# Patient Record
Sex: FEMALE | Race: WHITE | Employment: OTHER | URBAN - METROPOLITAN AREA
[De-identification: names, ages, dates, MRNs, and addresses within clinical notes are randomized per-mention and may not be internally consistent; named-entity substitution may affect disease eponyms.]

---

## 2018-08-09 ENCOUNTER — OFFICE VISIT (OUTPATIENT)
Dept: ORTHOPEDIC SURGERY | Age: 53
End: 2018-08-09

## 2018-08-09 VITALS
DIASTOLIC BLOOD PRESSURE: 84 MMHG | HEIGHT: 65 IN | BODY MASS INDEX: 35.99 KG/M2 | SYSTOLIC BLOOD PRESSURE: 144 MMHG | HEART RATE: 93 BPM | WEIGHT: 216 LBS

## 2018-08-09 DIAGNOSIS — S83.241A TEAR OF MEDIAL MENISCUS OF RIGHT KNEE, UNSPECIFIED TEAR TYPE, UNSPECIFIED WHETHER OLD OR CURRENT TEAR, INITIAL ENCOUNTER: ICD-10-CM

## 2018-08-09 DIAGNOSIS — M25.561 RIGHT KNEE PAIN, UNSPECIFIED CHRONICITY: Primary | ICD-10-CM

## 2018-08-09 PROCEDURE — 99203 OFFICE O/P NEW LOW 30 MIN: CPT | Performed by: ORTHOPAEDIC SURGERY

## 2018-08-09 RX ORDER — CELECOXIB 200 MG/1
CAPSULE ORAL
COMMUNITY
Start: 2012-01-26

## 2018-08-09 RX ORDER — VENLAFAXINE HYDROCHLORIDE 75 MG/1
75 CAPSULE, EXTENDED RELEASE ORAL
COMMUNITY
Start: 2018-06-27

## 2018-08-09 RX ORDER — METOPROLOL SUCCINATE 25 MG/1
25 TABLET, EXTENDED RELEASE ORAL
COMMUNITY
Start: 2018-06-11 | End: 2019-05-16 | Stop reason: ALTCHOICE

## 2018-08-09 ASSESSMENT — ENCOUNTER SYMPTOMS: SHORTNESS OF BREATH: 1

## 2018-08-09 NOTE — PROGRESS NOTES
tablet Take 100 mg by mouth once as needed. May repeat in two hours as needed        No current facility-administered medications for this visit. Allergies: Allergies   Allergen Reactions    Amoxicillin Nausea Only    Aspirin Nausea Only       Physical Exam:  Vitals:    08/09/18 1348   BP: (!) 144/84   Pulse: 93     General: Kirk Vegas is a healthy and well appearing 46y.o. year old female who is sitting comfortably in our office in no acute distress. Neuro: alert. oriented  Eyes: Extra-ocular muscles intact  Mouth: Oral mucosa moist. No perioral lesions  Pulm: Respirations unlabored and regular. Heart: Regular rate and rhythm   Skin: warm, well perfused    Knee Examination:  She has exquisite tenderness over the medial joint line and medial meniscus.       RIGHT     LEFT  General:   EFFUSION:     [] NL(4) [x] Mild(3) [] Mod(2) [] Sev(1)   [x] NL(4) [] Mild(3) [] Mod(2) [] Sev(1)    TOTAL FLEX:  [x] NL(4) [] Mild(3) [] Mod(2) [] Sev(1)   [x] NL(4) [] Mild(3) [] Mod(2) [] Sev(1)    LACK of EXT:  [x] NL(4) [] Mild(3) [] Mod(2) [] Sev(1)   [x] NL(4) [] Mild(3) [] Mod(2) [] Sev(1)    QUAD WEAK: [] NL(4) [x] Mild(3) [] Mod(2) [] Sev(1)   [x] NL(4) [] Mild(3) [] Mod(2) [] Sev(1)    Points (16)  R:       L:          Tibio Femoral:       RIGHT     LEFT  JT LINE PAIN:     [] NL(4) [] Mild(3) [x] Mod(2) [] Sev(1)   [x] NL(4) [] Mild(3) [] Mod(2) [] Sev(1)    CREPITUS:        [x] NL(4) [] Mild(3) [] Mod(2) [] Sev(1)   [x] NL(4) [] Mild(3) [] Mod(2) [] Sev(1)    COMP PAIN:       [x] NL(4) [] Mild(3) [] Mod(2) [] Sev(1)   [x] NL(4) [] Mild(3) [] Mod(2) [] Sev(1)    Points (12)  R:      L:      Patello Femoral Joint:        RIGHT     LEFT  CREPITUS:                 [] NL(4) [x] Mild(3) [] Mod(2) [] Sev(1)   [x] NL(4) [] Mild(3) [] Mod(2) [] Sev(1)    COMP PAIN:               [x] NL(4) [] Mild(3) [] Mod(2) [] Sev(1)   [x] NL(4) [] Mild(3) [] Mod(2) [] Sev(1)    SOFT TISSUE PAIN:  [x] NL(4) []Mild(3) []Mod(2) [] signs of acute trauma. Patient has very mild narrowing of the medial tibiofemoral compartment bilaterally. No other acute bony abnormalities. Diagnosis  Patient is a 59-year-old female status post a work-related injury on 3/14/2018 after she had slipped on some ice and injured the right medial sided knee. Patient is having symptoms of a medial meniscus tear. Plan: At this time we would like to obtain an MRI of the right knee to establish any injuries to her ligaments and meniscus. In the meantime she was asked to use cryotherapy and continue take the Celebrex to minimize any discomfort and swelling. She was also asked to look into various options for weight loss. We are going to submit his C9 for physical therapy and an MRI. All her questions were fully answered today. 3:36 PM      Luli Vasquez PA-C  Orthopaedic Sports Medicine Physician Assistant    During this examination, Isabelle Garvin PA-C, functioned as a scribe for Dr. Jared Rosa. This dictation was performed with a verbal recognition program (DRAGON) and it was checked for errors. It is possible that there are still dictated errors within this office note. If so, please bring any errors to my attention for an addendum. All efforts were made to ensure that this office note is accurate. This dictation was performed with a verbal recognition program (DRAGON) and it was checked for errors. It is possible that there are still dictated errors within this office note. If so, please bring any errors to my attention for an addendum. All efforts were made to ensure that this office note is accurate. Supervising Physician Attestation:  I, Dr. Jared Rosa, personally performed the services described in this documentation as scribed above, and it is both accurate and complete and I agree with all pertinent clinical information. I personally interviewed the patient and performed a physical examination and medical decision making.

## 2018-08-15 DIAGNOSIS — M25.561 RIGHT KNEE PAIN, UNSPECIFIED CHRONICITY: Primary | ICD-10-CM

## 2018-08-15 RX ORDER — DICLOFENAC SODIUM 75 MG/1
50 TABLET, DELAYED RELEASE ORAL 2 TIMES DAILY WITH MEALS
Qty: 40 TABLET | Refills: 1 | Status: SHIPPED | OUTPATIENT
Start: 2018-08-15 | End: 2019-05-16 | Stop reason: ALTCHOICE

## 2018-08-23 ENCOUNTER — OFFICE VISIT (OUTPATIENT)
Dept: ORTHOPEDIC SURGERY | Age: 53
End: 2018-08-23

## 2018-08-23 VITALS
WEIGHT: 216 LBS | BODY MASS INDEX: 35.99 KG/M2 | DIASTOLIC BLOOD PRESSURE: 97 MMHG | HEART RATE: 77 BPM | HEIGHT: 65 IN | SYSTOLIC BLOOD PRESSURE: 162 MMHG

## 2018-08-23 DIAGNOSIS — S83.241D TEAR OF MEDIAL MENISCUS OF RIGHT KNEE, CURRENT, UNSPECIFIED TEAR TYPE, SUBSEQUENT ENCOUNTER: Primary | ICD-10-CM

## 2018-08-23 DIAGNOSIS — M25.561 RIGHT KNEE PAIN, UNSPECIFIED CHRONICITY: ICD-10-CM

## 2018-08-23 PROCEDURE — 99213 OFFICE O/P EST LOW 20 MIN: CPT | Performed by: ORTHOPAEDIC SURGERY

## 2018-08-23 NOTE — PROGRESS NOTES
Chief Complaint    Follow-up (Rt knee MRI Results)      History of Present Illness:  Robby Moon is a 46 y.o. female who presents for follow up of her right knee. The patient was last seen on 8/9/2018 following a work injury. The patient reports that she was pulling some EMG equipment and a suitcase on March 14, 2018 and she slipped on some snow and really jerked her right knee. She began having medial sided right knee pain immediately. She thought this would improve over time however it has not. Patient reports that the pain can cause her to limp and swell. Patient reports the pain can become sharp on occasion especially if she's been on her feet for extended present time. Patient notes her pain is usually around an 8 out of 10. Patient denies any episodes of catching or locking. She has not had any treatment for this injury. The patient reports that her pain has only started to get worse over time. She states that she has been struggling with work and walking. The patient reports no new injuries at this time. Pain Assessment:  Location: right knee  Level: 8 out of 10  Duration: months  Description: sharp, aching  Result of an injury: yes  Work related injury: yes  Aggravating actions: everything  Relieving Factors: rest         Past Medical History:   Diagnosis Date    Arthritis     Brain tumor (benign) ([address]     H/O back injury     L4-L5    HBP (high blood pressure)     Migraine         Past Surgical History:   Procedure Laterality Date    BRAIN SURGERY      TUBAL LIGATION         No family history on file.     Social History     Social History    Marital status: Single     Spouse name: N/A    Number of children: N/A    Years of education: N/A     Social History Main Topics    Smoking status: Former Smoker     Quit date: 1/1/1997    Smokeless tobacco: Never Used    Alcohol use No    Drug use: No    Sexual activity: Not Asked     Other Topics Concern    None     Social History Narrative complains of pain the to the medial patella-femoral compartment and the pain going up to a 8 out of 10. She has done conservative treatment for the past 6 months and it is in my opinion that with medical certainty the patients diagnosis of a medial meniscus tear is directly related to the injury she sustained on March 14, 2018. The patient is in need of a right knee arthroscopy and we will submit a C9 for this to be approved. Diagnoses and treatments were reviewed with the patient today. Patient education material was provided. Questions were entertained and answered to the patient's satisfaction. Follow up for pre-operative appointment. [unfilled]  5:09 PM      IRiana ATC am scribing for and in the presence of Dr. Brandan Coy. 08/23/18 5:09 PM   Riana Bautista ATC    This dictation was performed with a verbal recognition program (DRAGON) and it was checked for errors. It is possible that there are still dictated errors within this office note. If so, please bring any errors to my attention for an addendum. All efforts were made to ensure that this office note is accurate. This dictation was performed with a verbal recognition program (DRAGON) and it was checked for errors. It is possible that there are still dictated errors within this office note. If so, please bring any errors to my attention for an addendum. All efforts were made to ensure that this office note is accurate. Supervising Physician Attestation:  I, Dr. Brandan Coy, personally performed the services described in this documentation as scribed above, and it is both accurate and complete and I agree with all pertinent clinical information. I personally interviewed the patient and performed a physical examination and medical decision making. I discussed the patient's condition and treatment options and have  reviewed and agree with the past medical, family and social history unless otherwise noted.  All of the patient's questions were answered.       Board Certified Orthopaedic Surgeon  [facility] and [address] and [address] and [employer]  Professor of [address]

## 2018-09-12 ENCOUNTER — TELEPHONE (OUTPATIENT)
Dept: ORTHOPEDIC SURGERY | Age: 53
End: 2018-09-12

## 2018-12-10 ENCOUNTER — OFFICE VISIT (OUTPATIENT)
Dept: ORTHOPEDIC SURGERY | Age: 53
End: 2018-12-10
Payer: COMMERCIAL

## 2018-12-10 VITALS
WEIGHT: 216.05 LBS | HEART RATE: 79 BPM | DIASTOLIC BLOOD PRESSURE: 80 MMHG | HEIGHT: 65 IN | SYSTOLIC BLOOD PRESSURE: 144 MMHG | BODY MASS INDEX: 36 KG/M2

## 2018-12-10 DIAGNOSIS — M25.561 RIGHT KNEE PAIN, UNSPECIFIED CHRONICITY: Primary | ICD-10-CM

## 2018-12-10 DIAGNOSIS — S83.241A TEAR OF MEDIAL MENISCUS OF RIGHT KNEE, UNSPECIFIED TEAR TYPE, UNSPECIFIED WHETHER OLD OR CURRENT TEAR, INITIAL ENCOUNTER: ICD-10-CM

## 2018-12-10 PROCEDURE — 99214 OFFICE O/P EST MOD 30 MIN: CPT | Performed by: ORTHOPAEDIC SURGERY

## 2019-04-05 ENCOUNTER — TELEPHONE (OUTPATIENT)
Dept: ORTHOPEDIC SURGERY | Age: 54
End: 2019-04-05

## 2019-04-05 NOTE — TELEPHONE ENCOUNTER
JESSICA GALAN I HAVE SCANNED A SURGERY RECOVERY PLAN & JOB DESCRIPTION FOR THIS IW, THAT WILL NEED TO BE COMPLETED FOLLOWING HER SURGERY POST OP INTO MEDIA.

## 2019-04-05 NOTE — TELEPHONE ENCOUNTER
04/05/2019 Surgery Approved by [facility]   [id number], [id number]      LM for patient surgery is approved and please call at her convenience to schedule surgery         osvaldo

## 2019-04-12 DIAGNOSIS — S83.242A ACUTE MEDIAL MENISCUS TEAR OF LEFT KNEE, INITIAL ENCOUNTER: Primary | ICD-10-CM

## 2019-04-22 ENCOUNTER — TELEPHONE (OUTPATIENT)
Dept: ORTHOPEDIC SURGERY | Age: 54
End: 2019-04-22

## 2019-04-22 DIAGNOSIS — S83.241A ACUTE MEDIAL MENISCUS TEAR OF RIGHT KNEE, INITIAL ENCOUNTER: Primary | ICD-10-CM

## 2019-04-22 NOTE — TELEPHONE ENCOUNTER
Patient called today 04/22/2019 stating she needs to cancel her surgery scheduled  for 05/07/2019 due to a family committed she forgot about.     05/07/2019 Right Knee Surgery - Cancelled     Patient would like to reschedule for 05/21/2019    Surgery will be rescheduled and new paper work to be mailed out     osvaldo

## 2019-05-16 RX ORDER — CETIRIZINE HYDROCHLORIDE 10 MG/1
10 TABLET ORAL
COMMUNITY
End: 2021-04-19 | Stop reason: ALTCHOICE

## 2019-05-16 RX ORDER — CYCLOBENZAPRINE HCL 10 MG
TABLET ORAL
COMMUNITY
Start: 2018-04-25

## 2019-05-16 RX ORDER — TRAMADOL HYDROCHLORIDE 50 MG/1
50 TABLET ORAL PRN
COMMUNITY

## 2019-05-20 ENCOUNTER — ANESTHESIA EVENT (OUTPATIENT)
Dept: OPERATING ROOM | Age: 54
End: 2019-05-20
Payer: COMMERCIAL

## 2019-05-21 ENCOUNTER — HOSPITAL ENCOUNTER (OUTPATIENT)
Age: 54
Setting detail: OUTPATIENT SURGERY
Discharge: HOME | End: 2019-05-21
Attending: ORTHOPAEDIC SURGERY | Admitting: ORTHOPAEDIC SURGERY
Payer: COMMERCIAL

## 2019-05-21 ENCOUNTER — TELEPHONE (OUTPATIENT)
Dept: ORTHOPEDIC SURGERY | Age: 54
End: 2019-05-21

## 2019-05-21 ENCOUNTER — ANESTHESIA (OUTPATIENT)
Dept: OPERATING ROOM | Age: 54
End: 2019-05-21
Payer: COMMERCIAL

## 2019-05-21 VITALS
HEIGHT: 64 IN | WEIGHT: 212 LBS | OXYGEN SATURATION: 98 % | HEART RATE: 89 BPM | SYSTOLIC BLOOD PRESSURE: 158 MMHG | BODY MASS INDEX: 36.19 KG/M2 | DIASTOLIC BLOOD PRESSURE: 90 MMHG | RESPIRATION RATE: 20 BRPM | TEMPERATURE: 98 F

## 2019-05-21 VITALS
RESPIRATION RATE: 13 BRPM | DIASTOLIC BLOOD PRESSURE: 65 MMHG | OXYGEN SATURATION: 96 % | SYSTOLIC BLOOD PRESSURE: 141 MMHG

## 2019-05-21 DIAGNOSIS — Z98.890 H/O ARTHROSCOPY OF KNEE: Primary | ICD-10-CM

## 2019-05-21 LAB — PREGNANCY, URINE: NEGATIVE

## 2019-05-21 PROCEDURE — 2709999900 HC NON-CHARGEABLE SUPPLY: Performed by: ORTHOPAEDIC SURGERY

## 2019-05-21 PROCEDURE — 3700000001 HC ADD 15 MINUTES (ANESTHESIA): Performed by: ORTHOPAEDIC SURGERY

## 2019-05-21 PROCEDURE — 2580000003 HC RX 258: Performed by: ORTHOPAEDIC SURGERY

## 2019-05-21 PROCEDURE — 6370000000 HC RX 637 (ALT 250 FOR IP): Performed by: ANESTHESIOLOGY

## 2019-05-21 PROCEDURE — 3700000000 HC ANESTHESIA ATTENDED CARE: Performed by: ORTHOPAEDIC SURGERY

## 2019-05-21 PROCEDURE — 7100000011 HC PHASE II RECOVERY - ADDTL 15 MIN: Performed by: ORTHOPAEDIC SURGERY

## 2019-05-21 PROCEDURE — 2580000003 HC RX 258: Performed by: ANESTHESIOLOGY

## 2019-05-21 PROCEDURE — 7100000010 HC PHASE II RECOVERY - FIRST 15 MIN: Performed by: ORTHOPAEDIC SURGERY

## 2019-05-21 PROCEDURE — 2500000003 HC RX 250 WO HCPCS: Performed by: NURSE ANESTHETIST, CERTIFIED REGISTERED

## 2019-05-21 PROCEDURE — 3600000014 HC SURGERY LEVEL 4 ADDTL 15MIN: Performed by: ORTHOPAEDIC SURGERY

## 2019-05-21 PROCEDURE — 2500000003 HC RX 250 WO HCPCS: Performed by: ORTHOPAEDIC SURGERY

## 2019-05-21 PROCEDURE — 6360000002 HC RX W HCPCS: Performed by: NURSE ANESTHETIST, CERTIFIED REGISTERED

## 2019-05-21 PROCEDURE — 84703 CHORIONIC GONADOTROPIN ASSAY: CPT

## 2019-05-21 PROCEDURE — 6360000002 HC RX W HCPCS: Performed by: ORTHOPAEDIC SURGERY

## 2019-05-21 PROCEDURE — 6360000002 HC RX W HCPCS: Performed by: ANESTHESIOLOGY

## 2019-05-21 PROCEDURE — 3600000004 HC SURGERY LEVEL 4 BASE: Performed by: ORTHOPAEDIC SURGERY

## 2019-05-21 RX ORDER — HYDROCODONE BITARTRATE AND ACETAMINOPHEN 5; 325 MG/1; MG/1
1 TABLET ORAL ONCE
Status: COMPLETED | OUTPATIENT
Start: 2019-05-21 | End: 2019-05-21

## 2019-05-21 RX ORDER — OXYCODONE HYDROCHLORIDE AND ACETAMINOPHEN 5; 325 MG/1; MG/1
2 TABLET ORAL PRN
Status: DISCONTINUED | OUTPATIENT
Start: 2019-05-21 | End: 2019-05-21 | Stop reason: HOSPADM

## 2019-05-21 RX ORDER — BUPIVACAINE HYDROCHLORIDE 2.5 MG/ML
INJECTION, SOLUTION INFILTRATION; PERINEURAL PRN
Status: DISCONTINUED | OUTPATIENT
Start: 2019-05-21 | End: 2019-05-21 | Stop reason: ALTCHOICE

## 2019-05-21 RX ORDER — PROPOFOL 10 MG/ML
INJECTION, EMULSION INTRAVENOUS PRN
Status: DISCONTINUED | OUTPATIENT
Start: 2019-05-21 | End: 2019-05-21 | Stop reason: SDUPTHER

## 2019-05-21 RX ORDER — ONDANSETRON 2 MG/ML
4 INJECTION INTRAMUSCULAR; INTRAVENOUS
Status: DISCONTINUED | OUTPATIENT
Start: 2019-05-21 | End: 2019-05-21 | Stop reason: HOSPADM

## 2019-05-21 RX ORDER — SODIUM CHLORIDE, SODIUM LACTATE, POTASSIUM CHLORIDE, CALCIUM CHLORIDE 600; 310; 30; 20 MG/100ML; MG/100ML; MG/100ML; MG/100ML
INJECTION, SOLUTION INTRAVENOUS CONTINUOUS
Status: DISCONTINUED | OUTPATIENT
Start: 2019-05-21 | End: 2019-05-21 | Stop reason: HOSPADM

## 2019-05-21 RX ORDER — SODIUM CHLORIDE 0.9 % (FLUSH) 0.9 %
10 SYRINGE (ML) INJECTION PRN
Status: DISCONTINUED | OUTPATIENT
Start: 2019-05-21 | End: 2019-05-21 | Stop reason: HOSPADM

## 2019-05-21 RX ORDER — FENTANYL CITRATE 50 UG/ML
INJECTION, SOLUTION INTRAMUSCULAR; INTRAVENOUS PRN
Status: DISCONTINUED | OUTPATIENT
Start: 2019-05-21 | End: 2019-05-21 | Stop reason: SDUPTHER

## 2019-05-21 RX ORDER — LIDOCAINE HYDROCHLORIDE 20 MG/ML
INJECTION, SOLUTION INFILTRATION; PERINEURAL PRN
Status: DISCONTINUED | OUTPATIENT
Start: 2019-05-21 | End: 2019-05-21 | Stop reason: SDUPTHER

## 2019-05-21 RX ORDER — SODIUM CHLORIDE 0.9 % (FLUSH) 0.9 %
10 SYRINGE (ML) INJECTION EVERY 12 HOURS SCHEDULED
Status: DISCONTINUED | OUTPATIENT
Start: 2019-05-21 | End: 2019-05-21 | Stop reason: HOSPADM

## 2019-05-21 RX ORDER — SODIUM CHLORIDE, SODIUM LACTATE, POTASSIUM CHLORIDE, AND CALCIUM CHLORIDE .6; .31; .03; .02 G/100ML; G/100ML; G/100ML; G/100ML
IRRIGANT IRRIGATION PRN
Status: DISCONTINUED | OUTPATIENT
Start: 2019-05-21 | End: 2019-05-21 | Stop reason: ALTCHOICE

## 2019-05-21 RX ORDER — PROMETHAZINE HYDROCHLORIDE 25 MG/ML
6.25 INJECTION, SOLUTION INTRAMUSCULAR; INTRAVENOUS
Status: DISCONTINUED | OUTPATIENT
Start: 2019-05-21 | End: 2019-05-21 | Stop reason: HOSPADM

## 2019-05-21 RX ORDER — MEPERIDINE HYDROCHLORIDE 50 MG/ML
12.5 INJECTION INTRAMUSCULAR; INTRAVENOUS; SUBCUTANEOUS EVERY 5 MIN PRN
Status: DISCONTINUED | OUTPATIENT
Start: 2019-05-21 | End: 2019-05-21 | Stop reason: HOSPADM

## 2019-05-21 RX ORDER — HYDROCODONE BITARTRATE AND ACETAMINOPHEN 5; 325 MG/1; MG/1
1 TABLET ORAL EVERY 6 HOURS PRN
Qty: 28 TABLET | Refills: 0 | Status: SHIPPED | OUTPATIENT
Start: 2019-05-21 | End: 2019-05-28

## 2019-05-21 RX ORDER — FENTANYL CITRATE 50 UG/ML
25 INJECTION, SOLUTION INTRAMUSCULAR; INTRAVENOUS EVERY 5 MIN PRN
Status: DISCONTINUED | OUTPATIENT
Start: 2019-05-21 | End: 2019-05-21 | Stop reason: HOSPADM

## 2019-05-21 RX ORDER — LIDOCAINE HYDROCHLORIDE 10 MG/ML
0.3 INJECTION, SOLUTION EPIDURAL; INFILTRATION; INTRACAUDAL; PERINEURAL
Status: DISCONTINUED | OUTPATIENT
Start: 2019-05-21 | End: 2019-05-21 | Stop reason: HOSPADM

## 2019-05-21 RX ORDER — ONDANSETRON 2 MG/ML
INJECTION INTRAMUSCULAR; INTRAVENOUS PRN
Status: DISCONTINUED | OUTPATIENT
Start: 2019-05-21 | End: 2019-05-21 | Stop reason: SDUPTHER

## 2019-05-21 RX ORDER — MIDAZOLAM HYDROCHLORIDE 1 MG/ML
INJECTION INTRAMUSCULAR; INTRAVENOUS PRN
Status: DISCONTINUED | OUTPATIENT
Start: 2019-05-21 | End: 2019-05-21 | Stop reason: SDUPTHER

## 2019-05-21 RX ORDER — OXYCODONE HYDROCHLORIDE AND ACETAMINOPHEN 5; 325 MG/1; MG/1
1 TABLET ORAL PRN
Status: DISCONTINUED | OUTPATIENT
Start: 2019-05-21 | End: 2019-05-21 | Stop reason: HOSPADM

## 2019-05-21 RX ORDER — HYDRALAZINE HYDROCHLORIDE 20 MG/ML
5 INJECTION INTRAMUSCULAR; INTRAVENOUS EVERY 10 MIN PRN
Status: DISCONTINUED | OUTPATIENT
Start: 2019-05-21 | End: 2019-05-21 | Stop reason: HOSPADM

## 2019-05-21 RX ORDER — DEXAMETHASONE SODIUM PHOSPHATE 4 MG/ML
INJECTION, SOLUTION INTRA-ARTICULAR; INTRALESIONAL; INTRAMUSCULAR; INTRAVENOUS; SOFT TISSUE PRN
Status: DISCONTINUED | OUTPATIENT
Start: 2019-05-21 | End: 2019-05-21 | Stop reason: SDUPTHER

## 2019-05-21 RX ADMIN — FENTANYL CITRATE 25 MCG: 50 INJECTION, SOLUTION INTRAMUSCULAR; INTRAVENOUS at 08:34

## 2019-05-21 RX ADMIN — SODIUM CHLORIDE, POTASSIUM CHLORIDE, SODIUM LACTATE AND CALCIUM CHLORIDE: 600; 310; 30; 20 INJECTION, SOLUTION INTRAVENOUS at 07:23

## 2019-05-21 RX ADMIN — ONDANSETRON 4 MG: 2 INJECTION INTRAMUSCULAR; INTRAVENOUS at 08:21

## 2019-05-21 RX ADMIN — HYDROCODONE BITARTRATE AND ACETAMINOPHEN 1 TABLET: 5; 325 TABLET ORAL at 09:25

## 2019-05-21 RX ADMIN — Medication 2 G: at 08:34

## 2019-05-21 RX ADMIN — HYDROMORPHONE HYDROCHLORIDE 0.5 MG: 1 INJECTION, SOLUTION INTRAMUSCULAR; INTRAVENOUS; SUBCUTANEOUS at 09:09

## 2019-05-21 RX ADMIN — PROPOFOL 200 MG: 10 INJECTION, EMULSION INTRAVENOUS at 08:28

## 2019-05-21 RX ADMIN — MIDAZOLAM HYDROCHLORIDE 2 MG: 2 INJECTION, SOLUTION INTRAMUSCULAR; INTRAVENOUS at 08:21

## 2019-05-21 RX ADMIN — DEXAMETHASONE SODIUM PHOSPHATE 10 MG: 4 INJECTION, SOLUTION INTRAMUSCULAR; INTRAVENOUS at 08:28

## 2019-05-21 RX ADMIN — HYDROMORPHONE HYDROCHLORIDE 0.5 MG: 1 INJECTION, SOLUTION INTRAMUSCULAR; INTRAVENOUS; SUBCUTANEOUS at 09:17

## 2019-05-21 RX ADMIN — FENTANYL CITRATE 25 MCG: 50 INJECTION, SOLUTION INTRAMUSCULAR; INTRAVENOUS at 08:24

## 2019-05-21 RX ADMIN — FENTANYL CITRATE 25 MCG: 50 INJECTION, SOLUTION INTRAMUSCULAR; INTRAVENOUS at 08:42

## 2019-05-21 RX ADMIN — LIDOCAINE HYDROCHLORIDE 60 MG: 20 INJECTION, SOLUTION INFILTRATION; PERINEURAL at 08:27

## 2019-05-21 ASSESSMENT — PULMONARY FUNCTION TESTS
PIF_VALUE: 10
PIF_VALUE: 4
PIF_VALUE: 11
PIF_VALUE: 13
PIF_VALUE: 10
PIF_VALUE: 10
PIF_VALUE: 1
PIF_VALUE: 11
PIF_VALUE: 13
PIF_VALUE: 13
PIF_VALUE: 19
PIF_VALUE: 0
PIF_VALUE: 6
PIF_VALUE: 13
PIF_VALUE: 0
PIF_VALUE: 27
PIF_VALUE: 13
PIF_VALUE: 13
PIF_VALUE: 0
PIF_VALUE: 13
PIF_VALUE: 11
PIF_VALUE: 9
PIF_VALUE: 13
PIF_VALUE: 2
PIF_VALUE: 20
PIF_VALUE: 9
PIF_VALUE: 22
PIF_VALUE: 13
PIF_VALUE: 6
PIF_VALUE: 0
PIF_VALUE: 0
PIF_VALUE: 13
PIF_VALUE: 0
PIF_VALUE: 10
PIF_VALUE: 0
PIF_VALUE: 13
PIF_VALUE: 11
PIF_VALUE: 21
PIF_VALUE: 10
PIF_VALUE: 7
PIF_VALUE: 5
PIF_VALUE: 0
PIF_VALUE: 18
PIF_VALUE: 10

## 2019-05-21 ASSESSMENT — PAIN DESCRIPTION - PAIN TYPE: TYPE: SURGICAL PAIN

## 2019-05-21 ASSESSMENT — PAIN SCALES - GENERAL
PAINLEVEL_OUTOF10: 8
PAINLEVEL_OUTOF10: 9
PAINLEVEL_OUTOF10: 9
PAINLEVEL_OUTOF10: 0
PAINLEVEL_OUTOF10: 4
PAINLEVEL_OUTOF10: 5
PAINLEVEL_OUTOF10: 10

## 2019-05-21 ASSESSMENT — PAIN DESCRIPTION - LOCATION: LOCATION: KNEE

## 2019-05-21 ASSESSMENT — PAIN - FUNCTIONAL ASSESSMENT: PAIN_FUNCTIONAL_ASSESSMENT: 0-10

## 2019-05-21 NOTE — H&P
I have reviewed the history and physical and examined the patient and find no relevant changes. I have reviewed with the patient and/or family the risks, benefits, and alternatives to the procedure. Patient being given increased dosage/quantity of opoid pain medication in excess of OSMB guidelines which noted a 30 MED daily of opioids due to the fact that he/she has undergone major orthopaedic surgery as outlined in rule 4731-11-13. Dosages and further duration of the pain medication will be re-evaluated at her post op visit in 2 weeks. Patient was educated on dosing expectations and limits of prescribing as a result of the new [facility] Board rules enacted August 31, 2017. Please also note that this is not the initial opoid prescription issued to this patient but a continuation of medication utilized during the hospital admission as noted in the medical record. OARRS report has also been utilized to screen for any abuse history or suspicious activity as outlined in [place]. All efforts have been taken to prevent abuse potential and misuse of opioid medications including education, screening, and close clinical follow up. Controlled Substances Monitoring:     RX Monitoring 5/21/2019   Attestation The Prescription Monitoring Report for this patient was reviewed today.    Chronic Pain Routine Monitoring No signs of potential drug abuse or diversion identified: otherwise, see note documentation

## 2019-05-21 NOTE — PROGRESS NOTES
Advanced pt from lying to sitting without adverse event/pt denies complaints of dizziness/ponv/ RESP  WNL/ surgical drsg/circulation checks on operative limb unchanged from my  last pacu assessment entry     Pt states pain is at a tolerable level-4     instructed pt if no void in 8 hours contact physician or go to ER    Discharge instructions reviewed with patient/responsible adult and understanding verbalized. Discharge instructions signed and copies given. Rx       norco             Given to pts responsible adult/instructed not to drive/ingest alcohol while taking narcotic medications. Instructed pt/family member Last dose of narcotics given in recovery room was           norco   @       0925      Am  . Medication information  sheet given to pt/family-all questions answered     Please follow narcotic administration as prescribed by your surgeon- any questions call your surgeon. If you have any problems contact your surgeon and  Go to the emergency room.     Operative drsg unchanged from my initial pacu assessment

## 2019-05-21 NOTE — OP NOTE
[facility]                                OPERATIVE REPORT    PATIENT NAME: Alec Workman                     :        1965  MED REC NO:   [id number]                          ROOM:  ACCOUNT NO:   [de-identified]                           ADMIT DATE: 2019  PROVIDER:     Kaye Chung MD    DATE OF PROCEDURE:  2019    SERVICE:  Orthopedic Surgery. SURGEON:  Lexy Marques MD    FIRST ASSISTANTSheba Avalos SA    PREOPERATIVE DIAGNOSES:  Medial meniscus tear of the right knee  associated with early osteoarthritis of the knee. POSTOPERATIVE DIAGNOSES:  1.  Large complex degenerative tear, posterior horn and mid body of the  medial meniscus, right knee involving posterior 35% of the meniscus. 2.  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 3.  Synovitis. OPERATIVE PROCEDURE:  1. Exam under anesthesia and video arthroscopy of the right knee. 2.  Partial medial meniscectomy. 3.  Chondroplasty of the patellofemoral compartment. 4.  Synovectomy. ANESTHESIA:  General.    COMPLICATIONS:  None. INSTRUMENT COUNTS:  Correct. DISPOSITION:  To the recovery room. ANTIBIOTICS:  Per SCIP protocol. ESTIMATED BLOOD LOSS:  Negligible. TOURNIQUET:  350 mmHg for approximately 30 minutes. INDICATIONS FOR SURGERY:  The patient is a 26-year-old woman who has  been struggling with an ongoing right knee pain. Her clinical  examination and MRI scan were consistent with the aforementioned  preoperative diagnoses. She presented today for arthroscopic surgery to  address her meniscus pathology. She realized her arthritis cannot be  cured by arthroscopic surgery.   We also discussed the concerns regarding  infection, deep vein thrombosis, pulmonary embolism, arthrofibrosis,  delayed rehabilitation, anesthetic complications including death,  cardiopulmonary issues, etc.  All questions were answered. I did meet  this lady in the preprocedure holding area and signed her knee and  answered any further questions as well. DETAILS OF SURGERY:  The patient was taken to the operating room and  placed on the operating table in the supine position. General  anesthesia was induced and maintained without difficulty. A time-out  was done and the right knee was examined. There was a 1+ effusion and  no other significant findings. After the leg was positioned, prepped, and draped, routine arthroscopic  portals were established. A tricompartmental examination was done. The medial compartment was examined first.  The patient had an obvious  large complex tear of the posterior horn and mid body of the lateral  meniscus. This was a complex tear with multiple different  configurations of both posteriorly and anteriorly based parrot-beak  flaps. I had to remove about the posterior 35% of the meniscus combined  this back to a carefully balanced stable rim. The patella was also  fairly deep in the meniscus. Ultimately, this was brought back to a  carefully balanced stable rim upon probing. The medial femoral condyle demonstrated some early grade 3 changes, but  nothing that required patellofemoral chondroplasty. The intercondylar notch was examined next. The ACL was visible. The  PCL was okay. The lateral compartment was normal including normal  lateral meniscus. The patellofemoral compartment demonstrated grade 3 chondromalacia of  the patella and primarily the patellofemoral groove. A chondroplasty  was completed utilizing the 4.5 incisor resector. All loose and frayed  cartilages were removed. A synovectomy was completed at the medial gutter, the lateral gutter,  and across the anterior aspect of the knee utilizing the 4.5 incisor. No further pathology was demonstrated. The knee was copiously irrigated  and the instruments were removed.   Dry sterile dressing was applied

## 2019-05-21 NOTE — TELEPHONE ENCOUNTER
LM FOR [employer] WITH [employer] REGARDING OBTAINING A FAX # TO FAX THE COMPLETED FMLA PAPERWORK    E-MAILED THE COMPLETED FMLA TO [id number] WITH [employer] @ [email or web address].

## 2019-05-21 NOTE — ANESTHESIA PRE PROCEDURE
Department of Anesthesiology  Preprocedure Note       Name:  Ada Blanc   Age:  48 y.o.  :  1965                                          MRN:  [id number]         Date:  2019      Surgeon:  Osiel Briggs MD    Procedure: Suzette Gilman UNDER ANESTHESIA VIDEO ARTHROSCOPY RIGHT KNEE, PARTIAL MEDIAL MENISCECTOMY, CHONDROPLASTY, SYNOVECTOMY -SLEEP APNEA- (Right Knee)    HPI:  This is a 49 y/o female who in 2018 had a right knee injury (Worker's Compensation injury). She has exhausted physical therapy and other nonsurgical measures. She does have an MRI documented medial meniscus tear. There is discussed regarding surgery and she is wondering if there is any other options for her. Medications prior to admission:    diclofenac sodium 1 % GEL Apply 2 g topically as needed for Pain   Erenumab-aooe (AIMOVIG) 70 MG/ML SOAJ Inject into the skin every 30 days   traMADol (ULTRAM) 50 MG tablet Take 50 mg by mouth as needed for Pain.    LISINOPRIL PO Take by mouth daily   cetirizine (ZYRTEC) 10 MG tablet Take 10 mg by mouth   celecoxib (CELEBREX) 200 MG capsule TAKE 1 CAPSULE BY MOUTH DAILY   venlafaxine (EFFEXOR XR) 75 MG   Take 75 mg by mouth   ferrous sulfate 325 (65 FE) MG tablet Take 325 mg by mouth daily (with breakfast)     Allergies:     Amoxicillin Nausea Only    Aspirin Nausea Only     Past Medical History:     Arthritis     [address] tumor [address]ben[facility]) [address] [address]     H/O back injury     L4-L5    HBP (high blood pressure)     Migraine      Past Surgical History:    BRAIN SURGERY      TUBAL LIGATION       Social History:     Smoking status: Former Smoker     Last attempt to quit: 1997     Years since quittin.3    Smokeless tobacco: Never Used   Substance Use Topics    Alcohol use: No     Vital Signs (Current):    BP: 133/78 Pulse: 84   Resp: 16 SpO2: 100   Temp: 98.1 °F (36.7 °C)   Height: 5' 4\" (1.626 m)  (19) Weight: 212 lb (96.2 kg)  (19)   BMI: 36.5           BP Readings from Last 3 Encounters:   12/10/18 (!) 144/80   08/23/18 (!) 162/97   08/09/18 (!) 144/84     NPO Status: > 8 hrs    CMP:     11/08/2014    K 3.6 11/08/2014     11/08/2014    CO2 24 11/08/2014    BUN 13 11/08/2014    CREATININE 0.6 03/06/2015    GLUCOSE 95 11/08/2014    PROT 7.6 03/06/2015    CALCIUM 9.4 11/08/2014    BILITOT 0.4 03/06/2015    ALKPHOS 67 03/06/2015    AST 14 03/06/2015    ALT 10 74/61/4515     HCG (If Applicable): Negative    Anesthesia Evaluation  Patient summary reviewed and Nursing notes reviewed  Airway: Mallampati: II  TM distance: >3 FB   Neck ROM: limited  Comment: Thick neck girth  Mouth opening: > = 3 FB Dental:          Pulmonary:   (+) sleep apnea:      (-) COPD and asthma                          ROS comment: Possible MEETA- I d/w risks of untreated MEETA and I urged patient to be tested. Cardiovascular:  Exercise tolerance: good (>4 METS),   (+) hypertension:,     (-)  angina and  CORREIA                Neuro/Psych:      (-) seizures, TIA and CVA            ROS comment: S/P Crani for benign tumor  3/2017 GI/Hepatic/Renal:        (-) GERD and no renal disease       Endo/Other:        (-) diabetes mellitus, hypothyroidism               Abdominal:           Vascular:     - DVT and PE. Anesthesia Plan      general     ASA 2       Induction: intravenous. MIPS: Prophylactic antiemetics administered. Anesthetic plan and risks discussed with patient. Plan discussed with CRNA.             Radha Perales MD

## 2019-05-21 NOTE — PROGRESS NOTES
Advancing hob without compaints  Tolerating Drinking and eating : Attempted to implement non pharmacological methods  of pain management-repositioned/ ice in place   Medicated for pain with improvement  States a level= 5  No ponv  Pt alert and appropriate  Respirations  Easy/ unlabored/ no Chest pain or SOB   Surgical drsg unchanged from initial assessment  Circulation checks on operative limb unchanged from last entry in pacu

## 2019-05-21 NOTE — BRIEF OP NOTE
Brief Postoperative Note  ______________________________________________________________    Patient: Keshia Suazo  YOB: 1965  MRN: [id number]  Date of Procedure: 5/21/2019    Pre-Op Diagnosis: MEDIAL MENISCUS TEAR RIGHT KNEE    Post-Op Diagnosis: Same       Procedure(s):  EXAM UNDER ANESTHESIA VIDEO ARTHROSCOPY RIGHT KNEE, PARTIAL MEDIAL MENISCECTOMY, CHONDROPLASTY, SYNOVECTOMY -SLEEP APNEA-    Anesthesia: General    Surgeon(s):  Lizbet Duvall MD    Assistant: Jacki MEJIA    Estimated Blood Loss (mL): less than 50     Complications: None    Specimens:   * No specimens in log *    Implants:  * No implants in log *      Drains: * No LDAs found *    Findings: OA, MMT    MANDO Hurt  Date: 5/21/2019  Time: 9:05 AM

## 2019-05-23 ENCOUNTER — HOSPITAL ENCOUNTER (OUTPATIENT)
Dept: PHYSICAL THERAPY | Age: 54
Setting detail: THERAPIES SERIES
Discharge: HOME | End: 2019-05-23
Payer: COMMERCIAL

## 2019-05-23 PROCEDURE — 97140 MANUAL THERAPY 1/> REGIONS: CPT | Performed by: PHYSICAL THERAPIST

## 2019-05-23 PROCEDURE — 97110 THERAPEUTIC EXERCISES: CPT | Performed by: PHYSICAL THERAPIST

## 2019-05-23 PROCEDURE — 97016 VASOPNEUMATIC DEVICE THERAPY: CPT | Performed by: PHYSICAL THERAPIST

## 2019-05-23 PROCEDURE — 97161 PT EVAL LOW COMPLEX 20 MIN: CPT | Performed by: PHYSICAL THERAPIST

## 2019-05-23 NOTE — PLAN OF CARE
[facility], [address] [facility]  [address] [place] [address]  Phone: [phone]  Fax [phone]     Physical Therapy Certification    Dear Referring Practitioner: Charlie Waller,    We had the pleasure of evaluating the following patient for physical therapy services at [address] [place] [address]. A summary of our findings can be found in the initial assessment below. This includes our plan of care. If you have any questions or concerns regarding these findings, please do not hesitate to contact me at the office phone number checked above. Thank you for the referral.       Physician Signature:_______________________________Date:__________________  By signing above (or electronic signature), therapists plan is approved by physician      Patient: Armando Herrera   : 1965   MRN: [id number]  Referring Physician: Referring Practitioner: Charlie Waller      Evaluation Date: 2019      Medical Diagnosis Information:  Diagnosis: R PMM/Chondroplasty       Treatment Diagnosis: Right knee pain M25.561                                      Insurance information: PT Insurance Information: [facility]      Precautions/ Contra-indications:   Latex Allergy:  [x]NO      []YES  Preferred Language for Healthcare:   [x]English       []other:    SUBJECTIVE: Patient stated complaint: Pt reports she was carrying some suitcases at work and twisted her knee last year in March. She was unable to have surgery until 19 d/t [facility]. She wore a brace and walked without crutches, but has been off work ( [employer]). She reports she was unable to get her knee completely flat.     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)    Height Weight  Easing factors: motion  Provocative factors: walking, bending     Type: []Constant   [x]Intermittent  []Radiating []Localized []other:     Paresthesias: denies  Occupation/School:[employer]    Living Status/Prior Level of Function: Independent with ADLs and IADLs      OBJECTIVE:      Initial Initial Current   VAS 4     LEFS 74%     ROM LEFT RIGHT    HIP Flex      HIP Abd      HIP add      HIP Ext      HIP IR      HIP ER      Knee ext  -5    Knee Flex  115    Ankle PF      Ankle DF      Ankle In      Ankle Ev      Strength  LEFT RIGHT    HIP Flexors      HIP Abductors      HIP add      HIP Ext      Hip ER      Hip IR      Knee EXT (quad)  Fair -    Knee Flex (HS)      Ankle DF      Ankle PF      Ankle Inv      Ankle EV            Circumference  MP  SP    45  47.5    LE Dermatomes      LE myotomes          Flexibility L R Comment   Hamstring  decreased    Gastroc   decreased     ITB       Quad      Hip flexor       Glut KTC       KTOS       Piriformis ig 4       Edmund             Reflexes/Sensation:    []Dermatomes/Myotomes intact    []Reflexes equal and normal bilaterally   []Other:    Joint mobility: decreased patellar mobility   []Normal    [x]Hypo   []Hyper    Palpation: TTP medial HS    Functional Mobility/Transfers: analgic gait    Posture: slightly flexed at trunk, flexed knee    Bandages/Dressings/Incisions: 3 incisions with steri-strips; no bruising    Gait: (include devices/WB status) antalgic without crutches    Orthopedic Special Tests: dnd d/t surgery  HIP KNEE ANKLE   Test Result Test Result Test Result   Scour  Lachman's  Anterior Drawer    Log Roll  MCMurrays's  Talar Tilt    HILLARY  Valgus   Squeeze Test    FADIR  Varus      Lisa's  Posterior Drawer        Patellar Apprehension                               [x] Patient history, allergies, meds reviewed. Medical chart reviewed. See intake form. Review Of Systems (ROS):  [x]Performed Review of systems (Integumentary, CardioPulmonary, Neurological) by intake and observation. Intake form has been scanned into medical record. Patient has been instructed to contact their primary care physician regarding ROS issues if not already being addressed at this time.       Co-morbidities/Complexities (which will affect course of rehabilitation):   []None           Arthritic conditions   [x]Rheumatoid arthritis (M05.9)  [x]Osteoarthritis (M19.91)   Cardiovascular conditions   [x]Hypertension (I10)  []Hyperlipidemia (E78.5)  []Angina pectoris (I20)  []Atherosclerosis (I70)   Musculoskeletal conditions   []Disc pathology   []Congenital spine pathologies   []Prior surgical intervention  []Osteoporosis (M81.8)  []Osteopenia (M85.8)   Endocrine conditions   []Hypothyroid (E03.9)  []Hyperthyroid Gastrointestinal conditions   []Constipation (T03.86)   Metabolic conditions   []Morbid obesity (E66.01)  []Diabetes type 1(E10.65) or 2 (E11.65)   []Neuropathy (G60.9)     Pulmonary conditions   []Asthma (J45)  []Coughing   []COPD (J44.9)   Psychological Disorders  [x]Anxiety (F41.9)  [x]Depression (F32.9)   []Other:   [x]Other:   Brain tumor removed 2017 (benign)       Barriers to/and or personal factors that will affect rehab potential:              []Age  []Sex              []Motivation/Lack of Motivation                        []Co-Morbidities              []Cognitive Function, education/learning barriers              []Environmental, home barriers              []profession/work barriers  []past PT/medical experience  []other:  Justification: pt will do well      Falls Risk Assessment (30 days):   [x] Falls Risk assessed and no intervention required. [] Falls Risk assessed and Patient requires intervention due to being higher risk   TUG score (>12s at risk):     [] Falls education provided. G-Codes:        ASSESSMENT:  Pt is a 48year old female with s/p R PMM/chondroplasty 5/21/19. She presents with the above post-operative deficits and will benefit from PT to address these issues and return to work.    Functional Impairments:     [x]Noted lumbar/proximal hip/LE joint hypomobility   [x]Decreased LE functional ROM   [x]Decreased core/proximal hip strength and neuromuscular control   [x]Decreased LE functional strength   []Reduced balance/proprioceptive control   []other:      Functional Activity Limitations (from functional questionnaire and intake)   [x]Reduced ability to tolerate prolonged functional positions   [x]Reduced ability or difficulty with changes of positions or transfers between positions   [x]Reduced ability to maintain good posture and demonstrate good body mechanics with sitting, bending, and lifting   []Reduced ability to sleep   [x] Reduced ability or tolerance with driving and/or computer work   [x]Reduced ability to perform lifting, carrying tasks   [x]Reduced ability to squat   []Reduced ability to forward bend   [x]Reduced ability to ambulate prolonged functional periods/distances/surfaces   [x]Reduced ability to ascend/descend stairs   [x]Reduced ability to run, hop, cut or jump   []other:    Participation Restrictions   [x]Reduced participation in self care activities   [x]Reduced participation in home management activities   [x]Reduced participation in work activities   [x]Reduced participation in social activities. []Reduced participation in sport/recreation activities. Classification :    [x]Signs/symptoms consistent with post-surgical status including decreased ROM, strength and function.    []Signs/symptoms consistent with joint sprain/strain   []Signs/symptoms consistent with patella-femoral syndrome   []Signs/symptoms consistent with knee OA/hip OA   []Signs/symptoms consistent with internal derangement of knee/Hip   []Signs/symptoms consistent with functional hip weakness/NMR control      []Signs/symptoms consistent with tendinitis/tendinosis    []signs/symptoms consistent with pathology which may benefit from Dry needling      []other:      Prognosis/Rehab Potential:      []Excellent   [x]Good    []Fair   []Poor    Tolerance of evaluation/treatment:    []Excellent   [x]Good    []Fair   []Poor    Physical Therapy Evaluation Complexity Justification  [x] A history of present problem with:  [] no personal factors and/or comorbidities that impact the plan of care;  []1-2 personal factors and/or comorbidities that impact the plan of care  [x]3 personal factors and/or comorbidities that impact the plan of care  [x] An examination of body systems using standardized tests and measures addressing any of the following: body structures and functions (impairments), activity limitations, and/or participation restrictions;:  [x] a total of 1-2 or more elements   [] a total of 3 or more elements   [] a total of 4 or more elements   [x] A clinical presentation with:  [x] stable and/or uncomplicated characteristics   [] evolving clinical presentation with changing characteristics  [] unstable and unpredictable characteristics;   [x] Clinical decision making of [x] low, [] moderate, [] high complexity using standardized patient assessment instrument and/or measurable assessment of functional outcome. [x] EVAL (LOW) 15591 (typically 20 minutes face-to-face)  [] EVAL (MOD) 28726 (typically 30 minutes face-to-face)  [] EVAL (HIGH) 25430 (typically 45 minutes face-to-face)  [] RE-EVAL     PLAN  Frequency/Duration:  2 days per week for 8 Weeks:  Interventions:  [x]  Therapeutic exercise including: strength training, ROM, for Lower extremity and core   [x]  NMR activation and proprioception for LE, Glutes and Core   [x]  Manual therapy as indicated for LE, Hip and spine to include: Dry Needling/IASTM, STM, PROM, Gr I-IV mobilizations, manipulation. [x] Modalities as needed that may include: thermal agents, E-stim, Biofeedback, US, iontophoresis as indicated  [x] Patient education on joint protection, postural re-education, activity modification, progression of HEP. HEP instruction: Reviewed HEP and pt given handout. GOALS:  Patient stated goal: return to work    Therapist goals for Patient:   Short Term Goals: To be achieved in: 2 weeks  1.  Independent in HEP and progression per patient tolerance, in order to prevent re-injury. 2. Patient will have a decrease in pain to facilitate improvement in movement, function, and ADLs as indicated by Functional Deficits. Long Term Goals: To be achieved in: 8 weeks  1. Disability index score of 40% or less for the LEFS to assist with reaching prior level of function. 2. Patient will demonstrate increased AROM to 0-125 to allow for proper joint functioning as indicated by patients Functional Deficits. 3. Patient will demonstrate an increase in Strength to good proximal hip strength and control >/=4+/5 allow for proper functional mobility as indicated by patients Functional Deficits. 4. Patient will return to ambulation functional activities without increased symptoms or restriction. 5. Pt will return to work without pain.       Electronically signed by:  Benjamin Bliss, PT , DPT, CDNT

## 2019-05-23 NOTE — FLOWSHEET NOTE
[facility], [address] [place]  Phone: [phone]  Fax [phone]    Physical Therapy Daily Treatment Note  Date:  2019    Patient Name:  Kenna Snyder    :  1965  MRN: [id number]  Restrictions/Precautions:    Physician Information:  Referring Practitioner: Rigo Kelley  Medical/Treatment Diagnosis Information:  · Diagnosis: R PMM/Chondroplasty  Treatment Diagnosis: Right knee pain M25.561   [] Conservative / [x] Surgical - DOS: 19  Therapy Diagnosis/Practice Pattern:  Practice Pattern I: Bony or Soft Tissue Surgery  Insurance/Certification information:  PT Insurance Information: [facility]  Plan of care signed: [] YES  [] NO  Number of Comorbidities:  []0     []1-2    [x]3+  Date of Patient follow up with Physician:     G-Code (if applicable):      Date G-Code Applied:         Progress Note: [x]  Yes  []  No  Next due by: Visit #10        Latex Allergy:  [x]NO      []YES  Preferred Language for Healthcare:   [x]English       []other:    Visit # Insurance Allowable Reporting Period   1 BW ????  Begin Date: 2019               End Date:      RECERT DUE BY: 8 weeks    SUBJECTIVE:  See eval    OBJECTIVE: See eval  Observation:  Palpation:      Initial Initial Current   VAS 4       LEFS 74%       ROM LEFT RIGHT     Knee ext   -5     Knee Flex   115     Strength  LEFT RIGHT     Knee EXT (quad)   Fair -     Circumference  MP  SP      45  47             RESTRICTIONS/PRECAUTIONS: brain tumor 2017- benign    Exercises/Interventions:     Therapeutic Ex Sets/reps Notes   Seated HS stretch :30x3    gastroc belt stretch :30x3    SLR flexion/ABD/extension x20 each    SB flexion x25         Pt ed anatomy, surgery, RICE, PT progression 8 min                                                      Manual Intervention     Patellar mobs 6 min    Bandage removal x1    Ace  x1                   NMR re-education     NMES QS  10 min 10/10 Towel roll                                               Therapeutic Exercise and NMR EXR  [x] (50076) Provided verbal/tactile cueing for activities related to strengthening, flexibility, endurance, ROM for improvements in LE, proximal hip, and core control with self care, mobility, lifting, ambulation.  [] (83725) Provided verbal/tactile cueing for activities related to improving balance, coordination, kinesthetic sense, posture, motor skill, proprioception  to assist with LE, proximal hip, and core control in self care, mobility, lifting, ambulation and eccentric single leg control.      NMR and Therapeutic Activities:    [x] (36246 or 30422) Provided verbal/tactile cueing for activities related to improving balance, coordination, kinesthetic sense, posture, motor skill, proprioception and motor activation to allow for proper function of core, proximal hip and LE with self care and ADLs  [] (82657) Gait Re-education- Provided training and instruction to the patient for proper LE, core and proximal hip recruitment and positioning and eccentric body weight control with ambulation re-education including up and down stairs     Home Exercise Program:    [x] (98343) Reviewed/Progressed HEP activities related to strengthening, flexibility, endurance, ROM of core, proximal hip and LE for functional self-care, mobility, lifting and ambulation/stair navigation   [] (62577)Reviewed/Progressed HEP activities related to improving balance, coordination, kinesthetic sense, posture, motor skill, proprioception of core, proximal hip and LE for self care, mobility, lifting, and ambulation/stair navigation      Manual Treatments:  PROM / STM / Oscillations-Mobs:  G-I, II, III, IV (PA's, Inf., Post.)  [x] (63041) Provided manual therapy to mobilize LE, proximal hip and/or LS spine soft tissue/joints for the purpose of modulating pain, promoting relaxation,  increasing ROM, reducing/eliminating soft tissue

## 2019-05-24 ENCOUNTER — OFFICE VISIT (OUTPATIENT)
Dept: ORTHOPEDIC SURGERY | Age: 54
End: 2019-05-24

## 2019-05-24 VITALS — WEIGHT: 212.08 LBS | HEIGHT: 64 IN | BODY MASS INDEX: 36.21 KG/M2

## 2019-05-24 DIAGNOSIS — M25.561 RIGHT KNEE PAIN, UNSPECIFIED CHRONICITY: ICD-10-CM

## 2019-05-24 DIAGNOSIS — S83.241A ACUTE MEDIAL MENISCUS TEAR OF RIGHT KNEE, INITIAL ENCOUNTER: Primary | ICD-10-CM

## 2019-05-24 PROCEDURE — 99024 POSTOP FOLLOW-UP VISIT: CPT | Performed by: PHYSICIAN ASSISTANT

## 2019-05-24 NOTE — PROGRESS NOTES
POSTOPERATIVE DIAGNOSES:  surgery 5/21/2019  1. Large complex degenerative tear, posterior horn and mid body of the  medial meniscus, right knee involving posterior 35% of the meniscus. 2.  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 3.  Synovitis. Nataliia Valverde History of present illness: The patient returns today for their first postoperative visit. Pain control has been satisfactory with oral medications. There have been no fevers or chills. The patient states she has been up on her knee a little bit the last couple of days and that has caused some increased swelling that she is using her crutch. Pain Assessment  Location of Pain: Knee  Location Modifiers: Right  Severity of Pain: 4  Quality of Pain: Dull, Aching  Duration of Pain: A few hours  Frequency of Pain: Several times daily  Aggravating Factors: Bending, Stretching, Walking  Limiting Behavior: Some  Relieving Factors: Ice, Rest  Result of Injury: No  Work-Related Injury: No  Are there other pain locations you wish to document?: No]    Physical examination: Inspection reveals expected swelling. Incisions are clean, dry, and intact. No signs of infection. Range of motion limited by pain and swelling. There is no calf pain or signs of DVT with a negative Homans sign. Assessment/plan: The patient is doing well after knee arthroscopy. Surgical findings were reviewed today  I have recommended ice, judicious use of the NSAIDs with GI precautions, and physical therapy to diminish swelling and restore both range of motion and strength. We'll see the patient back in about 2 weeks to assess her ability to return to work.

## 2019-05-28 ENCOUNTER — HOSPITAL ENCOUNTER (OUTPATIENT)
Dept: PHYSICAL THERAPY | Age: 54
Setting detail: THERAPIES SERIES
Discharge: HOME | End: 2019-05-28
Payer: COMMERCIAL

## 2019-05-28 PROCEDURE — G0283 ELEC STIM OTHER THAN WOUND: HCPCS | Performed by: PHYSICAL THERAPIST

## 2019-05-28 PROCEDURE — 97140 MANUAL THERAPY 1/> REGIONS: CPT | Performed by: PHYSICAL THERAPIST

## 2019-05-28 PROCEDURE — 97110 THERAPEUTIC EXERCISES: CPT | Performed by: PHYSICAL THERAPIST

## 2019-05-28 PROCEDURE — 97016 VASOPNEUMATIC DEVICE THERAPY: CPT | Performed by: PHYSICAL THERAPIST

## 2019-05-28 NOTE — FLOWSHEET NOTE
[facility], [address] [place]  Phone: [phone]  Fax [phone]    Physical Therapy Daily Treatment Note  Date:  2019    Patient Name:  Vanita Velazquez    :  1965  MRN: [id number]  Restrictions/Precautions:    Physician Information:  Referring Practitioner: Silvio Doyle  Medical/Treatment Diagnosis Information:  · Diagnosis: R PMM/Chondroplasty  Treatment Diagnosis: Right knee pain M25.561   [] Conservative / [x] Surgical - DOS: 19  Therapy Diagnosis/Practice Pattern:  Practice Pattern I: Bony or Soft Tissue Surgery  Insurance/Certification information:  PT Insurance Information: [facility]  Plan of care signed: [] YES  [] NO  Number of Comorbidities:  []0     []1-2    [x]3+  Date of Patient follow up with Physician:     G-Code (if applicable):      Date G-Code Applied:         Progress Note: [x]  Yes  []  No  Next due by: Visit #10        Latex Allergy:  [x]NO      []YES  Preferred Language for Healthcare:   [x]English       []other:    Visit # Insurance Allowable Reporting Period   2 [facility] ???? Begin Date: 2019               End Date:      RECERT DUE BY: 8 weeks    SUBJECTIVE:  I notice it gets stiff when I sit long periods, but I feel like the swelling is going down.     OBJECTIVE:   Observation:  Palpation:  Spasm medial HS and gastroc      Initial Initial Current   VAS 4       LEFS 74%       ROM LEFT RIGHT     Knee ext   -5     Knee Flex   115     Strength  LEFT RIGHT     Knee EXT (quad)   Fair -     Circumference  MP  SP      45  47             RESTRICTIONS/PRECAUTIONS: brain tumor 2017- benign    Exercises/Interventions:     Therapeutic Ex Sets/reps Notes   Seated HS stretch :30x3    gastroc belt stretch :30x3    SLR flexion/ABD/extension x20 each    SB flexion x25         Pt ed anatomy, surgery, RICE, PT progression          bike 5min    CCTKE 2x15 BVL   HR 2x10                                  Manual Intervention     Patellar mobs 3 min    Bandage removal     Ace      STW medial HS and gastroc 8 min              NMR re-education     NMES QS  10 min 10/10 Towel roll   D.D WS 3D x25 each                                           Therapeutic Exercise and NMR EXR  [x] (23867) Provided verbal/tactile cueing for activities related to strengthening, flexibility, endurance, ROM for improvements in LE, proximal hip, and core control with self care, mobility, lifting, ambulation.  [] (60661) Provided verbal/tactile cueing for activities related to improving balance, coordination, kinesthetic sense, posture, motor skill, proprioception  to assist with LE, proximal hip, and core control in self care, mobility, lifting, ambulation and eccentric single leg control.      NMR and Therapeutic Activities:    [x] (40773 or 32297) Provided verbal/tactile cueing for activities related to improving balance, coordination, kinesthetic sense, posture, motor skill, proprioception and motor activation to allow for proper function of core, proximal hip and LE with self care and ADLs  [] (43711) Gait Re-education- Provided training and instruction to the patient for proper LE, core and proximal hip recruitment and positioning and eccentric body weight control with ambulation re-education including up and down stairs     Home Exercise Program:    [x] (11120) Reviewed/Progressed HEP activities related to strengthening, flexibility, endurance, ROM of core, proximal hip and LE for functional self-care, mobility, lifting and ambulation/stair navigation   [] (35224)Reviewed/Progressed HEP activities related to improving balance, coordination, kinesthetic sense, posture, motor skill, proprioception of core, proximal hip and LE for self care, mobility, lifting, and ambulation/stair navigation      Manual Treatments:  PROM / STM / Oscillations-Mobs:  G-I, II, III, IV (PA's, Inf., Post.)  [x] (04175) Provided manual therapy to mobilize LE, proximal hip and/or LS spine soft tissue/joints for the purpose of modulating pain, promoting relaxation,  increasing ROM, reducing/eliminating soft tissue swelling/inflammation/restriction, improving soft tissue extensibility and allowing for proper ROM for normal function with self care, mobility, lifting and ambulation. Modalities:       [] GR/ESU 15 min    [x] GR 15 min  [] ESU     [] CP    [] MHP    [] declined     Charges:  Timed Code Treatment Minutes: 25   Total Treatment Minutes: 70     [x] EVAL (LOW) 79322 (typically 20 minutes face-to-face) (935a-950a)  [] EVAL (MOD) 16617 (typically 30 minutes face-to-face)  [] EVAL (HIGH) 80976 (typically 45 minutes face-to-face)  [] RE-EVAL     [x] MG(11916) x  1 215-230p [] IONTO  [] NMR (54006) x      [x] VASO 245-300p  [x] Manual (05101) x  1  205-215p  [] Other:  [] TA x       [] Mech Traction (95106)  [] ES(attended) (90165)      [x] ES (un) (51825): NMES 232-242p    GOALS:   Long Term Goals: To be achieved in: 8 weeks  1. Disability index score of 40% or less for the LEFS to assist with reaching prior level of function. 2. Patient will demonstrate increased AROM to 0-125 to allow for proper joint functioning as indicated by patients Functional Deficits. 3. Patient will demonstrate an increase in Strength to good proximal hip strength and control >/=4+/5 allow for proper functional mobility as indicated by patients Functional Deficits. 4. Patient will return to ambulation functional activities without increased symptoms or restriction. 5. Pt will return to work without pain. New or Updated Goals (if applicable):  [x] No change to goals established upon initial eval/last progress note:  New Goals:    Progression Towards Functional goals:   [] Patient is progressing as expected towards functional goals listed. [] Progression is slowed due to complexities listed. [] Progression has been slowed due to co-morbidities.   [x] Plan just implemented, too soon to assess goals progression  [] Other:     ASSESSMENT:    [] Improvement noted relative to goals:  [] No Improvement noted related to goals:  Summary/Patient's response to treatment: Tolerated session well, but was unable to perform supine bridge or supine TE d/t reports of LBP. Progress with LBP in mind.     Treatment/Activity Tolerance:  [] Patient tolerated treatment well [] Patient limited by fatique  [] Patient limited by pain  [] Patient limited by other medical complications  [] Other:     Prognosis: [] Good [] Fair  [] Poor    Patient Requires Follow-up: [x] Yes  [] No    PLAN: See eval  [x] Continue per plan of care [] Alter current plan (see comments)  [] Plan of care initiated [] Hold pending MD visit [] Discharge    Electronically signed by: Jennie Arango, PT , DPT, CDNT

## 2019-05-30 ENCOUNTER — TELEPHONE (OUTPATIENT)
Dept: ORTHOPEDIC SURGERY | Age: 54
End: 2019-05-30

## 2019-05-30 ENCOUNTER — APPOINTMENT (OUTPATIENT)
Dept: PHYSICAL THERAPY | Age: 54
End: 2019-05-30
Payer: COMMERCIAL

## 2019-05-30 DIAGNOSIS — S83.241A ACUTE MEDIAL MENISCUS TEAR OF RIGHT KNEE, INITIAL ENCOUNTER: Primary | ICD-10-CM

## 2019-05-30 RX ORDER — HYDROCODONE BITARTRATE AND ACETAMINOPHEN 5; 325 MG/1; MG/1
1 TABLET ORAL EVERY 6 HOURS PRN
Qty: 28 TABLET | Refills: 0 | Status: SHIPPED | OUTPATIENT
Start: 2019-05-30 | End: 2019-06-06

## 2019-05-30 NOTE — TELEPHONE ENCOUNTER
Workability note was not completed at the time of the office visit 5/24/19  and is now being requested by Christian Vaughan . Notified Ld [facility]Dr. [facility]    Once notified the [facility] note has been completed I will forward to Tracie.

## 2019-06-03 ENCOUNTER — TELEPHONE (OUTPATIENT)
Dept: ORTHOPEDIC SURGERY | Age: 54
End: 2019-06-03

## 2019-06-03 ENCOUNTER — OFFICE VISIT (OUTPATIENT)
Dept: ORTHOPEDIC SURGERY | Age: 54
End: 2019-06-03

## 2019-06-03 VITALS — WEIGHT: 212.08 LBS | BODY MASS INDEX: 36.21 KG/M2 | HEIGHT: 64 IN

## 2019-06-03 DIAGNOSIS — Z98.890 H/O ARTHROSCOPY OF KNEE: Primary | ICD-10-CM

## 2019-06-03 PROCEDURE — 99024 POSTOP FOLLOW-UP VISIT: CPT | Performed by: ORTHOPAEDIC SURGERY

## 2019-06-03 NOTE — PROGRESS NOTES
Chief Complaint   Patient presents with    Post-Op Check     RIGHT KNEE SCOPE 5/21/19     History of present illness: The patient returns today for a followup after right knee arthroscopy performed on 5/21/2019. Has been working with physical therapy and progressing well. She is still struggling with swelling and pain in the knee however, this is improving. She complains of posterior fullness in the knee. Physical examination: Incisions are well healed with no signs of infection. Late stage ecchymosis noted along the anterior tibia. The patient demonstrates range of motion 2-120°. Quad tone is adequate. Normal gait without the use of ambulatory devices. Assessment/plan: The patient is doing well after knee arthroscopy. Resting well and is going to continue working with physical therapy. She is going to return to work on 06/10/2019 with light duty/seated work. She is then going to return to the clinic on 6/14/2019 to discuss returning to work      [facility]    This dictation was performed with a verbal recognition program [facility]) and it was checked for errors. It is possible that there are still dictated errors within this office note. If so, please bring any errors to my attention for an addendum. All efforts were made to ensure that this office note is accurate.

## 2019-06-04 ENCOUNTER — APPOINTMENT (OUTPATIENT)
Dept: PHYSICAL THERAPY | Age: 54
End: 2019-06-04
Payer: COMMERCIAL

## 2019-06-07 ENCOUNTER — HOSPITAL ENCOUNTER (OUTPATIENT)
Dept: PHYSICAL THERAPY | Age: 54
Setting detail: THERAPIES SERIES
Discharge: HOME | End: 2019-06-07
Payer: COMMERCIAL

## 2019-06-07 PROCEDURE — G0283 ELEC STIM OTHER THAN WOUND: HCPCS | Performed by: PHYSICAL THERAPIST

## 2019-06-07 PROCEDURE — 97110 THERAPEUTIC EXERCISES: CPT | Performed by: PHYSICAL THERAPIST

## 2019-06-07 PROCEDURE — 97016 VASOPNEUMATIC DEVICE THERAPY: CPT | Performed by: PHYSICAL THERAPIST

## 2019-06-07 PROCEDURE — 97112 NEUROMUSCULAR REEDUCATION: CPT | Performed by: PHYSICAL THERAPIST

## 2019-06-07 PROCEDURE — 97140 MANUAL THERAPY 1/> REGIONS: CPT | Performed by: PHYSICAL THERAPIST

## 2019-06-07 NOTE — FLOWSHEET NOTE
[facility], [address] [place]  Phone: [phone]  Fax [phone]    Physical Therapy Daily Treatment Note  Date:  2019    Patient Name:  Wili Mcpherson    :  1965  MRN: [id number]  Restrictions/Precautions:    Physician Information:  Referring Practitioner: Orion Chacon  Medical/Treatment Diagnosis Information:  · Diagnosis: R PMM/Chondroplasty  Treatment Diagnosis: Right knee pain M25.561   [] Conservative / [x] Surgical - DOS: 19  Therapy Diagnosis/Practice Pattern:  Practice Pattern I: Bony or Soft Tissue Surgery  Insurance/Certification information:  PT Insurance Information: [facility]  Plan of care signed: [] YES  [] NO  Number of Comorbidities:  []0     []1-2    [x]3+  Date of Patient follow up with Physician:     G-Code (if applicable):      Date G-Code Applied:         Progress Note: [x]  Yes  []  No  Next due by: Visit #10        Latex Allergy:  [x]NO      []YES  Preferred Language for Healthcare:   [x]English       []other:    Visit # Insurance Allowable Reporting Period   3 [facility] ???? Begin Date: 2019               End Date:      RECERT DUE BY: 8 weeks    SUBJECTIVE:  Pt. Reports that she missed one of the last 2 appt.s due to the knee being too sore. OBJECTIVE:   Observation:able to fully straighten knee on the table. Mod. Swelling R knee. Palpation:  Lat.  Patellar ret.       Initial Initial Current   VAS 4       LEFS 74%       ROM LEFT RIGHT     Knee ext   -5     Knee Flex   115     Strength  LEFT RIGHT     Knee EXT (quad)   Fair -     Circumference  MP  SP      45  47             RESTRICTIONS/PRECAUTIONS: brain tumor 2017- benign    Exercises/Interventions:     Therapeutic Ex Sets/reps Notes   Seated HS stretch :30x3    gastroc belt stretch :30x3    SLR flexion/ABD/extension x20 each F only due to LBP   SB flexion x25         Pt ed anatomy, surgery, RICE, PT progression          bike [id number] Treatments:  PROM / STM / Oscillations-Mobs:  G-I, II, III, IV (PA's, Inf., Post.)  [x] (14224) Provided manual therapy to mobilize LE, proximal hip and/or LS spine soft tissue/joints for the purpose of modulating pain, promoting relaxation,  increasing ROM, reducing/eliminating soft tissue swelling/inflammation/restriction, improving soft tissue extensibility and allowing for proper ROM for normal function with self care, mobility, lifting and ambulation. Modalities:       [] GR/ESU 15 min    [x] GR 15 min  [] ESU     [] CP    [] MHP    [] declined     Charges:  Timed Code Treatment Minutes: 38   Total Treatment Minutes: 70     [] EVAL (LOW) 69571 (typically 20 minutes face-to-face) (935a-950a)  [] EVAL (MOD) 31322 (typically 30 minutes face-to-face)  [] EVAL (HIGH) 06203 (typically 45 minutes face-to-face)  [] RE-EVAL     [x] IX(35319) x  2 1115-1145ap [] IONTO  [] NMR (19721) x      [x] VASO 1203-1318p  [x] Manual (97136) x  1  1145a-1153ap  [] Other:  [] TA x       [] Mech Traction (80364)  [] ES(attended) (27478)      [x] ES (un) (06133): NMES [id number]    GOALS:   Long Term Goals: To be achieved in: 8 weeks  1. Disability index score of 40% or less for the LEFS to assist with reaching prior level of function. 2. Patient will demonstrate increased AROM to 0-125 to allow for proper joint functioning as indicated by patients Functional Deficits. 3. Patient will demonstrate an increase in Strength to good proximal hip strength and control >/=4+/5 allow for proper functional mobility as indicated by patients Functional Deficits. 4. Patient will return to ambulation functional activities without increased symptoms or restriction. 5. Pt will return to work without pain.       New or Updated Goals (if applicable):  [x] No change to goals established upon initial eval/last progress note:  New Goals:    Progression Towards Functional goals:   [] Patient is progressing as expected towards functional goals listed. [] Progression is slowed due to complexities listed. [] Progression has been slowed due to co-morbidities. [x] Plan just implemented, too soon to assess goals progression  [] Other:     ASSESSMENT:    [] Improvement noted relative to goals:  [] No Improvement noted related to goals:  Summary/Patient's response to treatment: improved tolerance to ex. Following modification due to pain. Increased knee flexion after mobs. Dec swelling after vaso. Pt. To cont. With ex. To inc quad tone and to avoid increased swelling with increased activity.     Treatment/Activity Tolerance:  [x] Patient tolerated treatment well [] Patient limited by fatique  [] Patient limited by pain  [] Patient limited by other medical complications  [] Other:     Prognosis: [] Good [x] Fair  [] Poor    Patient Requires Follow-up: [x] Yes  [] No    PLAN: See eval  [x] Continue per plan of care [] Alter current plan (see comments)  [] Plan of care initiated [] Hold pending MD visit [] Discharge    Electronically signed by: Tilden Harada, [place], [address]

## 2019-06-10 ENCOUNTER — OFFICE VISIT (OUTPATIENT)
Dept: ORTHOPEDIC SURGERY | Age: 54
End: 2019-06-10

## 2019-06-10 VITALS — BODY MASS INDEX: 36.21 KG/M2 | WEIGHT: 212.08 LBS | HEIGHT: 64 IN

## 2019-06-10 DIAGNOSIS — Z98.890 H/O ARTHROSCOPY OF KNEE: Primary | ICD-10-CM

## 2019-06-10 PROCEDURE — 99024 POSTOP FOLLOW-UP VISIT: CPT | Performed by: PHYSICIAN ASSISTANT

## 2019-06-10 NOTE — PROGRESS NOTES
POSTOPERATIVE DIAGNOSES:  surgery 5/21/2019  1.  Large complex degenerative tear, posterior horn and mid body of the  medial meniscus, right knee involving posterior 35% of the meniscus. 2.  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 3.  Synovitis. Alexandr Riggs History of present illness: The patient returns today for a followup after knee arthroscopy. Pain control has been satisfactory with oral medications. She was initially supposed to return to work today but states that due to swelling and pain, she does not feel capable. She still has quite a bit of soreness to the anterior knee. She states she has missed a couple physical therapy visits as well but plans on returning. Physical examination: Incisions are well healed with no signs of infection. Range of motion 0 to 110 degrees with mild stiffness. Quad tone is adequate. Antalgic gait pattern without use of assistive devices. Assessment/plan: Patient is making progress. She still does have some swelling and stiffness of the knee and I recommended she get back to formal PT.   We will give her another week off of work and she will plan to return on June 17, 2019

## 2019-06-12 ENCOUNTER — HOSPITAL ENCOUNTER (OUTPATIENT)
Dept: PHYSICAL THERAPY | Age: 54
Setting detail: THERAPIES SERIES
Discharge: HOME | End: 2019-06-12
Payer: COMMERCIAL

## 2019-06-12 PROCEDURE — 97110 THERAPEUTIC EXERCISES: CPT | Performed by: PHYSICAL THERAPIST

## 2019-06-12 PROCEDURE — 97140 MANUAL THERAPY 1/> REGIONS: CPT | Performed by: PHYSICAL THERAPIST

## 2019-06-12 PROCEDURE — 97112 NEUROMUSCULAR REEDUCATION: CPT | Performed by: PHYSICAL THERAPIST

## 2019-06-12 PROCEDURE — 97016 VASOPNEUMATIC DEVICE THERAPY: CPT | Performed by: PHYSICAL THERAPIST

## 2019-06-12 NOTE — FLOWSHEET NOTE
[facility] [facility], [address] [place]  Phone: [phone]  Fax [phone]    Physical Therapy Daily Treatment Note  Date:  2019    Patient Name:  Leslye Loo    :  1965  MRN: [id number]  Restrictions/Precautions:    Physician Information:  Referring Practitioner: Radha Cooper  Medical/Treatment Diagnosis Information:  · Diagnosis: R PMM/Chondroplasty  Treatment Diagnosis: Right knee pain M25.561   [] Conservative / [x] Surgical - DOS: 19  Therapy Diagnosis/Practice Pattern:  Practice Pattern I: Bony or Soft Tissue Surgery  Insurance/Certification information:  PT Insurance Information: [facility]  Plan of care signed: [] YES  [] NO  Number of Comorbidities:  []0     []1-2    [x]3+  Date of Patient follow up with Physician:     G-Code (if applicable):      Date G-Code Applied:         Progress Note: [x]  Yes  []  No  Next due by: Visit #10        Latex Allergy:  [x]NO      []YES  Preferred Language for Healthcare:   [x]English       []other:    Visit # Insurance Allowable Reporting Period   3 [facility] 18 Begin Date: 2019               End Date:      RECERT DUE BY: 8 weeks    SUBJECTIVE:  Pt reports frustration with consistent knee pain. She has not been able to make it to PT.      OBJECTIVE:   Observation: lacking ~4 degrees ext at beginning of session; mod swelling; TTP PT, lateral incision restriction  Palpation:  Lat.  Patellar ret.       Initial Initial Current   VAS 4       LEFS 74%       ROM LEFT RIGHT     Knee ext   -5     Knee Flex   115     Strength  LEFT RIGHT     Knee EXT (quad)   Fair -     Circumference  MP  SP      45  47             RESTRICTIONS/PRECAUTIONS: brain tumor 2017- benign    Exercises/Interventions:     Therapeutic Ex Sets/reps Notes   Seated HS stretch :30x3    gastroc belt stretch :30x3    SLR flexion/ABD/extension x20 each    SB flexion x25         Pt ed anatomy, surgery, RICE, PT progression bike [id number]    CCTKE 2x15 BVL   HR B 2x10         SAQ 2x10                        Manual Intervention     Patellar mobs 5 min    STW incision 8 min    PT CFM 3 min    HS/lat HS stretch 2 min each    Mobs for knee ext 5'    PROM      NMR re-education     NMES QS  10 min 10/10 Towel roll   D.D WS 3D dndx25 each              Tandem stance :10x3 B    Standing SLR Ed for Hep                       Therapeutic Exercise and NMR EXR  [x] (07415) Provided verbal/tactile cueing for activities related to strengthening, flexibility, endurance, ROM for improvements in LE, proximal hip, and core control with self care, mobility, lifting, ambulation.  [] (37694) Provided verbal/tactile cueing for activities related to improving balance, coordination, kinesthetic sense, posture, motor skill, proprioception  to assist with LE, proximal hip, and core control in self care, mobility, lifting, ambulation and eccentric single leg control.      NMR and Therapeutic Activities:    [x] (36707 or 36858) Provided verbal/tactile cueing for activities related to improving balance, coordination, kinesthetic sense, posture, motor skill, proprioception and motor activation to allow for proper function of core, proximal hip and LE with self care and ADLs  [] (20214) Gait Re-education- Provided training and instruction to the patient for proper LE, core and proximal hip recruitment and positioning and eccentric body weight control with ambulation re-education including up and down stairs     Home Exercise Program:    [x] (78242) Reviewed/Progressed HEP activities related to strengthening, flexibility, endurance, ROM of core, proximal hip and LE for functional self-care, mobility, lifting and ambulation/stair navigation   [] (75617)Reviewed/Progressed HEP activities related to improving balance, coordination, kinesthetic sense, posture, motor skill, proprioception of core, proximal hip and LE for self care, mobility, lifting, and progressing as expected towards functional goals listed. [] Progression is slowed due to complexities listed. [] Progression has been slowed due to co-morbidities. [x] Plan just implemented, too soon to assess goals progression  [] Other:     ASSESSMENT:    [] Improvement noted relative to goals:  [] No Improvement noted related to goals:  Summary/Patient's response to treatment: improved mobility and decreased pain post-manuals. No advancement in TE this visit. Encouraged pt to focus on HEP and ice/compression in order to achieve appropriate muscle activation and  Optimal function.     Treatment/Activity Tolerance:  [x] Patient tolerated treatment well [] Patient limited by fatique  [] Patient limited by pain  [] Patient limited by other medical complications  [] Other:     Prognosis: [] Good [x] Fair  [] Poor    Patient Requires Follow-up: [x] Yes  [] No    PLAN: See eval  [x] Continue per plan of care [] Alter current plan (see comments)  [] Plan of care initiated [] Hold pending MD visit [] Discharge    Electronically signed by: Ma Schilder, PT , DPT, CDNT

## 2019-06-14 ENCOUNTER — HOSPITAL ENCOUNTER (OUTPATIENT)
Dept: PHYSICAL THERAPY | Age: 54
Setting detail: THERAPIES SERIES
Discharge: HOME | End: 2019-06-14
Payer: COMMERCIAL

## 2019-06-14 PROCEDURE — 97140 MANUAL THERAPY 1/> REGIONS: CPT | Performed by: PHYSICAL THERAPIST

## 2019-06-14 PROCEDURE — 97110 THERAPEUTIC EXERCISES: CPT | Performed by: PHYSICAL THERAPIST

## 2019-06-14 PROCEDURE — G0283 ELEC STIM OTHER THAN WOUND: HCPCS | Performed by: PHYSICAL THERAPIST

## 2019-06-14 NOTE — FLOWSHEET NOTE
[facility] [facility], [address]  Phone: [phone]  Fax [phone]    Physical Therapy Daily Treatment Note  Date:  2019    Patient Name:  Cathy Art    :  1965  MRN: [id number]  Restrictions/Precautions:    Physician Information:  Referring Practitioner: Rhonda Manrique  Medical/Treatment Diagnosis Information:  · Diagnosis: R PMM/Chondroplasty  Treatment Diagnosis: Right knee pain M25.561   [] Conservative / [x] Surgical - DOS: 19  Therapy Diagnosis/Practice Pattern:  Practice Pattern I: Bony or Soft Tissue Surgery  Insurance/Certification information:  PT Insurance Information: [facility]  Plan of care signed: [] YES  [] NO  Number of Comorbidities:  []0     []1-2    [x]3+  Date of Patient follow up with Physician:     G-Code (if applicable):      Date G-Code Applied:         Progress Note: [x]  Yes  []  No  Next due by: Visit #10        Latex Allergy:  [x]NO      []YES  Preferred Language for Healthcare:   [x]English       []other:    Visit # Insurance Allowable Reporting Period   4 [facility] 18 Begin Date: 2019               End Date:      RECERT DUE BY: 8 weeks    SUBJECTIVE:  Pt reports improvement since last session, but still random pain in the the knee. I can't stay long today. I didn't really do much yesterday. OBJECTIVE:   Observation: lacking ~2 degrees ext at beginning of session; mod swelling; TTP PT, lateral incision restriction  Palpation:  Lat.  Patellar ret.       Initial Initial Current   VAS 4       LEFS 74%       ROM LEFT RIGHT     Knee ext   -5 0 post-session    Knee Flex   115     Strength  LEFT RIGHT     Knee EXT (quad)   Fair -     Circumference  MP  SP      45  47             RESTRICTIONS/PRECAUTIONS: brain tumor 2017- benign    Exercises/Interventions:     Therapeutic Ex Sets/reps Notes   Supine HS stretch/lateral HS stretch :30x3 each    gastroc belt stretch :30x3    SLR proprioception of core, proximal hip and LE for self care, mobility, lifting, and ambulation/stair navigation      Manual Treatments:  PROM / STM / Oscillations-Mobs:  G-I, II, III, IV (PA's, Inf., Post.)  [x] (02412) Provided manual therapy to mobilize LE, proximal hip and/or LS spine soft tissue/joints for the purpose of modulating pain, promoting relaxation,  increasing ROM, reducing/eliminating soft tissue swelling/inflammation/restriction, improving soft tissue extensibility and allowing for proper ROM for normal function with self care, mobility, lifting and ambulation. Modalities:       [] GR/ESU 15 min    [] GR 15 min  [] ESU     [] CP    [] MHP    [x] declined     Charges:  Timed Code Treatment Minutes: 23   Total Treatment Minutes: 40     [] EVAL (LOW) 31619 (typically 20 minutes face-to-face) (935a-950a)  [] EVAL (MOD) 42988 (typically 30 minutes face-to-face)  [] EVAL (HIGH) 67222 (typically 45 minutes face-to-face)  [] RE-EVAL     [x] CS(86454) x  [address]r [] IONTO  [] NMR (58255) x    1055-1105p  [] VASO 9069-7243C  [x] Manual (84822) x  1  1007-1017a  [] Other:  [] TA x       [] Mech Traction (75082)  [] ES(attended) (13829)      [x] ES (un) (04935): NMES [id number]    GOALS:   Long Term Goals: To be achieved in: 8 weeks  1. Disability index score of 40% or less for the LEFS to assist with reaching prior level of function. 2. Patient will demonstrate increased AROM to 0-125 to allow for proper joint functioning as indicated by patients Functional Deficits. 3. Patient will demonstrate an increase in Strength to good proximal hip strength and control >/=4+/5 allow for proper functional mobility as indicated by patients Functional Deficits. 4. Patient will return to ambulation functional activities without increased symptoms or restriction. 5. Pt will return to work without pain.       New or Updated Goals (if applicable):  [x] No change to goals established upon initial eval/last progress note:  New Goals:    Progression Towards Functional goals:   [] Patient is progressing as expected towards functional goals listed. [] Progression is slowed due to complexities listed. [] Progression has been slowed due to co-morbidities. [x] Plan just implemented, too soon to assess goals progression  [] Other:     ASSESSMENT:    [] Improvement noted relative to goals:  [] No Improvement noted related to goals:  Summary/Patient's response to treatment: short on time. Improved mobility of PFJ and scar tissue. Discussed compression sleeve for work. Pt still demonstrates deficits and full AROM ext and poor quad firing.     Treatment/Activity Tolerance:  [x] Patient tolerated treatment well [] Patient limited by fatique  [] Patient limited by pain  [] Patient limited by other medical complications  [] Other:     Prognosis: [] Good [x] Fair  [] Poor    Patient Requires Follow-up: [x] Yes  [] No    PLAN: See eval  [x] Continue per plan of care [] Alter current plan (see comments)  [] Plan of care initiated [] Hold pending MD visit [] Discharge    Electronically signed by: Enoc Walton, PT , DPT, CDNT

## 2020-06-23 ENCOUNTER — OFFICE VISIT (OUTPATIENT)
Dept: PRIMARY CARE CLINIC | Age: 55
End: 2020-06-23

## 2020-06-23 VITALS — HEART RATE: 97 BPM | TEMPERATURE: 98 F | OXYGEN SATURATION: 97 %

## 2020-06-23 PROCEDURE — 99213 OFFICE O/P EST LOW 20 MIN: CPT | Performed by: FAMILY MEDICINE

## 2020-06-23 NOTE — PATIENT INSTRUCTIONS
Advance Care Planning  People with COVID-19 may have no symptoms, mild symptoms, such as fever, cough, and shortness of breath or they may have more severe illness, developing severe and fatal pneumonia. As a result, Advance Care Planning with attention to naming a health care decision maker (someone you trust to make healthcare decisions for you if you could not speak for yourself) and sharing other health care preferences is important BEFORE a possible health crisis. Please contact your Primary Care Provider to discuss Advance Care Planning. Preventing the Spread of Coronavirus Disease 2019 in Homes and Residential Communities  For the most recent information go to [email or web address]    Prevention steps for People with confirmed or suspected COVID-19 (including persons under investigation) who do not need to be hospitalized  and   People with confirmed COVID-19 who were hospitalized and determined to be medically stable to go home    Your healthcare provider and public health staff will evaluate whether you can be cared for at home. If it is determined that you do not need to be hospitalized and can be isolated at home, you will be monitored by staff from your local or state health department. You should follow the prevention steps below until a healthcare provider or local or state health department says you can return to your normal activities. Stay home except to get medical care  People who are mildly ill with COVID-19 are able to isolate at home during their illness. You should restrict activities outside your home, except for getting medical care. Do not go to work, school, or public areas. Avoid using public transportation, ride-sharing, or taxis. Separate yourself from other people and animals in your home  People: As much as possible, you should stay in a specific room and away from other people in your home.  Also, you should use a separate

## 2020-06-23 NOTE — PROGRESS NOTES
6/23/2020    FLU/COVID-19 CLINIC EVALUATION    HPI  SYMPTOMS: Asymptomatic    Symptom duration, days:  [] 1   [] 2   [] 3   [] 4   [] 5   [] 6   [] 7   [] 8   [] 9   [] 10   [] 11   [] 12   [] 13 [] 14 +    [x] Alert   [x]Oriented to person/place/time    [x]No apparent distress     []Toxic appearing    [x]Breathing appears normal     []Appears tachypneic         [x]Speaking in full sentences     Symptom course:   [] Worsening     [] Stable     [] Improving    [] Fevers  [] Symptom (not measured)  [] Measured (Result: )  [] Chills  [] Cough  [] Coughing up blood  []Productive  []Dry  [] Chest Congestion  []Chest Tightness  [] Nasal Congestion  []Runny Nose  [] Feeling short of breath  []Fatigue  [] Chest pain  [] Headaches  []Tolerable  [] Severe  [] Sore throat  [] Muscle aches  [] Nausea  [] Decreased appetite  [] Vomiting  []Unable to keep fluids down  [] Diarrhea  []Severe    [] OTHER SYMPTOMS:      RISK FACTORS:    [] Pregnant or possibly pregnant  [] Age over 61  [] Diabetes  [] Heart disease  [] Asthma  [] COPD/Other chronic lung diseases  [] Active Cancer  [] On Chemotherapy  [] Taking oral steroids  [] History Lymphoma/Leukemia  [x] Close contact with a lab confirmed COVID-19 patient within 14 days of symptom onset  [] History of travel from affected geographical areas within 14 days of symptom onset  [] Health Care Worker Exposure no symptoms  [] Health Care Worker Exposure symptomatic      VITALS:  Vitals:    06/23/20 1231   Pulse: 97   Temp: 98 °F (36.7 °C)   SpO2: 97%      PHYSICAL EXAMINATION:        [x]Alert   [x]Oriented to person/place/time    [x]No apparent distress     []Toxic appearing    [x]Breathing appears normal     []Appears tachypneic         [x]Speaking in full sentences     TESTS:    POCT FLU:  [] Positive     []Negative  POCT STREP:  [] Positive     []Negative    [x] COVID-19 Test sent:Covid-19 Blue      ASSESSMENT:    [] Flu  [] Strep Throat  [] Uncertain Viral Respiratory Illness  [x]

## 2020-06-26 LAB
SARS-COV-2: NOT DETECTED
SOURCE: NORMAL

## 2021-04-07 ENCOUNTER — PRE-PROCEDURE TELEPHONE (OUTPATIENT)
Dept: RADIATION ONCOLOGY | Age: 56
End: 2021-04-07

## 2021-04-07 DIAGNOSIS — D32.9 MENINGIOMA (HCC): Primary | ICD-10-CM

## 2021-04-07 NOTE — PROGRESS NOTES
Patient was called today for [facility] consult with Dr. Moses Washington. After consultation patient has decided to go forward with [facility] radiosurgery. Patient was also able to watch the Gamma Knife from the Patient's Perspective video and received Dr. Newby Chimera biography via email. The patient declines to have a pre op appointment with Dr. Silverio Lima and is agreeable to meeting him day of procedure. This RN reviewed pre-procedure instructions and provided patient with a written copy via email. Patient will call with date of her H&P. We discussed the patient's date of procedure will be on Monday, 4/19/2021. All patient's questions were answered. Encouraged to call with any further questions or concerns.      Dede Bedolla RN

## 2021-04-12 RX ORDER — BACITRACIN, NEOMYCIN, POLYMYXIN B 400; 3.5; 5 [USP'U]/G; MG/G; [USP'U]/G
OINTMENT TOPICAL ONCE
Status: CANCELLED | OUTPATIENT
Start: 2021-04-12 | End: 2021-04-12

## 2021-04-12 RX ORDER — DEXAMETHASONE SODIUM PHOSPHATE 4 MG/ML
4 INJECTION, SOLUTION INTRA-ARTICULAR; INTRALESIONAL; INTRAMUSCULAR; INTRAVENOUS; SOFT TISSUE ONCE
Status: CANCELLED | OUTPATIENT
Start: 2021-04-12 | End: 2021-04-12

## 2021-04-12 RX ORDER — SODIUM BICARBONATE 42 MG/ML
1.5 INJECTION, SOLUTION INTRAVENOUS ONCE
Status: CANCELLED | OUTPATIENT
Start: 2021-04-12 | End: 2021-04-12

## 2021-04-12 RX ORDER — ONDANSETRON 2 MG/ML
4 INJECTION INTRAMUSCULAR; INTRAVENOUS ONCE
Status: CANCELLED | OUTPATIENT
Start: 2021-04-12 | End: 2021-04-12

## 2021-04-12 RX ORDER — 0.9 % SODIUM CHLORIDE 0.9 %
500 INTRAVENOUS SOLUTION INTRAVENOUS ONCE
Status: CANCELLED | OUTPATIENT
Start: 2021-04-12 | End: 2021-04-12

## 2021-04-12 RX ORDER — LIDOCAINE HYDROCHLORIDE 10 MG/ML
30 INJECTION, SOLUTION EPIDURAL; INFILTRATION; INTRACAUDAL; PERINEURAL ONCE
Status: CANCELLED | OUTPATIENT
Start: 2021-04-12 | End: 2021-04-12

## 2021-04-12 RX ORDER — BUPIVACAINE HYDROCHLORIDE 5 MG/ML
30 INJECTION, SOLUTION EPIDURAL; INTRACAUDAL ONCE
Status: CANCELLED | OUTPATIENT
Start: 2021-04-12 | End: 2021-04-12

## 2021-04-16 ENCOUNTER — TELEPHONE (OUTPATIENT)
Dept: RADIATION ONCOLOGY | Age: 56
End: 2021-04-16

## 2021-04-16 NOTE — TELEPHONE ENCOUNTER
This RN spoke to patient today about change in plan of care. Pt will not be having single fraction, framed gamma knife but will be switching to 5 fraction treatment with mask per Dr. Jerry Gomez. Pt will still come in Monday 4/19 and have her scheduled MRI at 0900 and then will follow with her mask/SIM at 1000. Pt updated on schedule change and agreeable to dates and times. 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      Kevin Izaguirre RN

## 2021-04-19 ENCOUNTER — HOSPITAL ENCOUNTER (OUTPATIENT)
Dept: RADIATION ONCOLOGY | Age: 56
Discharge: HOME | End: 2021-04-19
Payer: COMMERCIAL

## 2021-04-19 ENCOUNTER — HOSPITAL ENCOUNTER (OUTPATIENT)
Dept: MRI IMAGING | Age: 56
Discharge: HOME | End: 2021-04-19
Payer: COMMERCIAL

## 2021-04-19 ENCOUNTER — APPOINTMENT (OUTPATIENT)
Dept: CT IMAGING | Age: 56
End: 2021-04-19
Payer: COMMERCIAL

## 2021-04-19 VITALS
RESPIRATION RATE: 17 BRPM | TEMPERATURE: 97.9 F | OXYGEN SATURATION: 97 % | HEART RATE: 90 BPM | SYSTOLIC BLOOD PRESSURE: 138 MMHG | DIASTOLIC BLOOD PRESSURE: 81 MMHG

## 2021-04-19 DIAGNOSIS — D32.9 MENINGIOMA (HCC): ICD-10-CM

## 2021-04-19 LAB
PERFORMED ON: NORMAL
POC SAMPLE TYPE: NORMAL

## 2021-04-19 PROCEDURE — 70553 MRI BRAIN STEM W/O & W/DYE: CPT

## 2021-04-19 PROCEDURE — 77290 THER RAD SIMULAJ FIELD CPLX: CPT

## 2021-04-19 PROCEDURE — 6360000004 HC RX CONTRAST MEDICATION: Performed by: NEUROLOGICAL SURGERY

## 2021-04-19 PROCEDURE — A9579 GAD-BASE MR CONTRAST NOS,1ML: HCPCS | Performed by: NEUROLOGICAL SURGERY

## 2021-04-19 PROCEDURE — 77334 RADIATION TREATMENT AID(S): CPT

## 2021-04-19 RX ORDER — MONTELUKAST SODIUM 10 MG/1
10 TABLET ORAL NIGHTLY
COMMUNITY

## 2021-04-19 RX ORDER — FOLIC ACID 1 MG/1
1 TABLET ORAL DAILY
COMMUNITY

## 2021-04-19 RX ORDER — LOSARTAN POTASSIUM 100 MG/1
100 TABLET ORAL DAILY
COMMUNITY

## 2021-04-19 RX ADMIN — GADOTERIDOL 20 ML: 279.3 INJECTION, SOLUTION INTRAVENOUS at 09:43

## 2021-04-19 NOTE — PROGRESS NOTES
This RN sent pt's new schedule for radiation treatment via email per pt's request.    Tawny Weber  1965    5/10 @ 9:30 AM - Dr. Ro Mchugh   5/11 @ 8:30 AM - Dr. Hay Crawford  5/12 @ 9:30 AM - Dr. Ro Mchugh - To Be Seen  5/13 @ 8:30 AM - Dr. Hay Crawford  5/14 @ 8:45 AM - Dr. Yesenia Pollard      **Please get a COVID test done on May 5th. You do NOT need an appointment.   Please come to [facility] between the hours of 8:30-3pm to have the testing completed**          Delmi Dickson RN

## 2021-04-19 NOTE — PROGRESS NOTES
1260  Patient arrival. Vital signs stable. Patient MRI completed. Patient fitted for Face Mask, tolerated with no complications. Consult with Dr. Shikha Rhoades and Dr. Ethel Love. Procedure and side effects gone over. Patient verbalized understanding. Consent signed.     Charlie Monahan RN

## 2021-05-05 ENCOUNTER — OFFICE VISIT (OUTPATIENT)
Dept: PRIMARY CARE CLINIC | Age: 56
End: 2021-05-05
Payer: COMMERCIAL

## 2021-05-05 DIAGNOSIS — Z01.818 PRE-OP EXAMINATION: Primary | ICD-10-CM

## 2021-05-05 PROCEDURE — 99421 OL DIG E/M SVC 5-10 MIN: CPT | Performed by: NURSE PRACTITIONER

## 2021-05-06 LAB — SARS-COV-2: NOT DETECTED

## 2021-05-10 ENCOUNTER — CLINICAL DOCUMENTATION (OUTPATIENT)
Dept: RADIATION ONCOLOGY | Age: 56
End: 2021-05-10

## 2021-05-10 ENCOUNTER — HOSPITAL ENCOUNTER (OUTPATIENT)
Dept: RADIATION ONCOLOGY | Age: 56
Discharge: HOME | End: 2021-05-10
Payer: COMMERCIAL

## 2021-05-10 VITALS
SYSTOLIC BLOOD PRESSURE: 177 MMHG | OXYGEN SATURATION: 97 % | HEART RATE: 76 BPM | TEMPERATURE: 97.2 F | DIASTOLIC BLOOD PRESSURE: 84 MMHG | RESPIRATION RATE: 18 BRPM

## 2021-05-10 PROCEDURE — 77334 RADIATION TREATMENT AID(S): CPT

## 2021-05-10 PROCEDURE — 77295 3-D RADIOTHERAPY PLAN: CPT

## 2021-05-10 PROCEDURE — 77300 RADIATION THERAPY DOSE PLAN: CPT

## 2021-05-10 PROCEDURE — 77373 STRTCTC BDY RAD THER TX DLVR: CPT

## 2021-05-10 NOTE — PROGRESS NOTES
Fractionated SRS treatment note on the gamma knife machine. Dell Rung  05/10/21       Patient presents for her 1st  of 5 treatments on the gamma knife machine for her  meningioma    She was taken to the gamma knife machine, and her mask was fitted to the machine. Patient confirmation and identification was confirmed using 2 means and a timeout procedure was also done prior to the procedure.     Cone beam CT scan was performed. These images were reviewed in detailed and fused upon her stereotactic reference images. Isodose lines were also reviewed. These were then accepted by myself and our physicist.     Fraction Number: 1  Dose delivered: 5Gy  Total dose delivered: 5 Gy  Planned total dose:  25Gy     The patient acutely tolerated the procedure well. Mask was removed. She will follow-up when the next appointment has been scheduled. I was present throughout the procedure.     Michelle Chan MD

## 2021-05-10 NOTE — OP NOTE
1 [facility]  PATIENT NAME:   Gini Logan   MR #:  [id number]  YOB: 1965   ACCOUNT #:  [de-identified]  SURGEON:  Walter Hubbard M.D. ADMIT DATE:  5/10/2021  SERVICE:  Neurosurgery  DICTATED BY: Walter Hubbard M.D. SURGERY DATE:  5/10/2021           OPERATIVE REPORT       PREOPERATIVE DIAGNOSIS:     1. Left frontal recurrent meningioma     POSTOPERATIVE DIAGNOSIS:     1. Same    PROCEDURE(S) PERFORMED:      1. Five-fraction frameless Gamma Knife radiosurgery for left frontal recurrent meningioma     TREATMENT DATES:     5/10/2021 through 5/14/2021 (five weekday treatments)      NEUROSURGEON: Brittani Orourke. Walker Krueger MD                                     RADIATION ONCOLOGIST: Eileen Arshad MD     ANESTHESIA: None    COMPLICATIONS: None    INDICATIONS: This is a 49-year-old woman with a left frontal WHO I meningioma who underwent craniotomy and subtotal resection on 3/17/17. The patient was followed expectantly and a recent MRI scan demonstrated recurrent left frontal meningioma. We discussed various treatment options but ultimately recommended five-fraction, frameless Gamma Knife radiosurgery. The patient was aware of the potential benefits in terms of tumor control and the possible risks including (but not limited to): brain swelling, seizures, bleeding, new neurological symptoms, and/or radiation injury of the brain. PERFORMANCE STATUS: 100%    SYSTEMIC DISEASE: Not applicable    DETAILS OF PROCEDURE: The patient underwent a radiosurgery fusion MRI scan prior to the procedure. A thermoplastic mask was formed and the patient underwent a reference cone-beam CT scan in the Gamma Knife Icon. These images were sent over the network to the [address] [place] [address] and fused with the MRI scan. An optimal treatment plan was generated and approved by the neurosurgeon, radiation oncologist, and medical physicist. All critical structure constraints were fulfilled.      On the days of treatment, the patient was positioned in the mask and a set-up cone-beam CT scan was performed. The bony landmarks were compared with the reference cone-beam CT to confirm set-up accuracy. The patient then underwent stereotactic radiosurgery treatment delivery. Intra-fraction movement was monitored by infrared throughout the procedure. Target Size (cm) Shape Shots Parameters Dose (Gy) Isodose (%)   Left frontal 4.21 Oval 31 5 Gy x 5  25 50     The left frontal target is complex based on diameter > 3.5 cm    The patient tolerated the procedure without difficulty. The patient was instructed on medications and the need for follow-up in two weeks. SPECIMENS REMOVED: None    ESTIMATED BLOOD LOSS: None    TOTAL TIME SPENT WITH PATIENT: In conformance with CMS regulations, I affirm that I participated in target contouring and development of the treatment plan. I was present at fraction #1 for patient positioning, review of cone-beam CT to confirm set-up accuracy, and radiation delivery.      Nicolette Martínez MD

## 2021-05-11 ENCOUNTER — HOSPITAL ENCOUNTER (OUTPATIENT)
Dept: RADIATION ONCOLOGY | Age: 56
Discharge: HOME | End: 2021-05-11
Payer: COMMERCIAL

## 2021-05-11 PROCEDURE — 77373 STRTCTC BDY RAD THER TX DLVR: CPT

## 2021-05-12 ENCOUNTER — HOSPITAL ENCOUNTER (OUTPATIENT)
Dept: RADIATION ONCOLOGY | Age: 56
Discharge: HOME | End: 2021-05-12
Payer: COMMERCIAL

## 2021-05-12 ENCOUNTER — CLINICAL DOCUMENTATION (OUTPATIENT)
Dept: RADIATION ONCOLOGY | Age: 56
End: 2021-05-12

## 2021-05-12 PROCEDURE — 77373 STRTCTC BDY RAD THER TX DLVR: CPT

## 2021-05-12 NOTE — PROGRESS NOTES
Pt completed 3/5 frameless treatments today. She is currently being [facility]     Manish Diane RN

## 2021-05-12 NOTE — PROGRESS NOTES
Fractionated SRS treatment note on the gamma knife machine. Beryl Babinski  05/12/21       Patient presents for her  3rd of 5 treatments on the gamma knife machine for her  meningioma    She was taken to the gamma knife machine, and her mask was fitted to the machine. Patient confirmation and identification was confirmed using 2 means and a timeout procedure was also done prior to the procedure.     Cone beam CT scan was performed. These images were reviewed in detailed and fused upon her stereotactic reference images. Isodose lines were also reviewed. These were then accepted by myself and our physicist.     Fraction Number:  3  Dose delivered: 5 Gy  Total dose delivered:  15Gy  Planned total dose:   25Gy     The patient acutely tolerated the procedure well. Mask was removed. She will follow-up when the next appointment has been scheduled. I was present throughout the procedure.     Aletha Mo MD

## 2021-05-13 ENCOUNTER — HOSPITAL ENCOUNTER (OUTPATIENT)
Dept: RADIATION ONCOLOGY | Age: 56
Discharge: HOME | End: 2021-05-13
Payer: COMMERCIAL

## 2021-05-13 PROCEDURE — 77373 STRTCTC BDY RAD THER TX DLVR: CPT

## 2021-05-13 NOTE — PROGRESS NOTES
Fractionated SRS treatment note on the gamma knife machine. Bull Moses  05/13/21       Patient presents for her first of 5 treatments on the gamma knife machine for her meningioma. She was taken to the gamma knife machine, and her mask was fitted to the machine. Patient confirmation and identification was confirmed using 2 means and a timeout procedure was also done prior to the procedure. Cone beam CT scan was performed. These images were reviewed in detailed and fused upon her stereotactic reference images. Isodose lines were also reviewed. These were then accepted by myself and our physicist.     Fraction Number: 1  Dose delivered: 5 Gy  Total dose delivered: 5Gy  Planned total dose:  25 Gy     The patient acutely tolerated the procedure well. Mask was removed. She will follow-up when the next appointment has been scheduled. I was present throughout the procedure.     Charline Ortiz MD

## 2021-05-14 PROCEDURE — 77336 RADIATION PHYSICS CONSULT: CPT

## 2021-05-14 PROCEDURE — 77373 STRTCTC BDY RAD THER TX DLVR: CPT

## 2021-05-17 ENCOUNTER — TELEPHONE (OUTPATIENT)
Dept: RADIATION ONCOLOGY | Age: 56
End: 2021-05-17

## 2021-05-17 ENCOUNTER — POST-OP TELEPHONE (OUTPATIENT)
Dept: RADIATION ONCOLOGY | Age: 56
End: 2021-05-17

## 2021-05-17 NOTE — TELEPHONE ENCOUNTER
Spoke with Dr. Moses Washington regarding patient's concerns. Per Dr. Moses Washington, patient is to just take 2 mg once a day of dexamethasone. We will contact her on Wednesday to see if she is feeling any better. Dr. Moses Washington believes this is related to her steroids and will get better when she comes off of them next week. This RN called patient back and gave her instructions from Dr. Moses Washington. Patient appreciative. Alerted patient that we will call her on Wednesday.

## 2021-05-17 NOTE — PROGRESS NOTES
Patient called stating starting 5/13, she's been agitated, frustrated, and cannot get her words out. She is tired, but cannot sleep. She also states her depth perception is off and [place]. She decided not to drive for the time being after that. She is currently on 2 mg dexamethasone BID and dropping down to 2 mg QD on 5/20. She spoke with Dr. Kaycee Wilks yesterday (5/16) who instructed her to not take her evening dose of dexamethasone. A call was place for Dr. Fran Oliva yesterday as well. The patient had not heard from anyone so was calling back. In order to help her sleep, the patient states she took 50 mg benadryl, 0.5 mg ativan, and 10 mg cyclobenzaprine. This RN called Dr. Fran Oliva and sent a message asking for a call back.

## 2021-05-18 ENCOUNTER — POST-OP TELEPHONE (OUTPATIENT)
Dept: RADIATION ONCOLOGY | Age: 56
End: 2021-05-18

## 2021-05-18 NOTE — PROGRESS NOTES
Pt's daughter, David Kapadia, called concerned about her mother. States her mother has been having extreme highs and lows, crying, yelling, and believes this is due to the medication that was prescribed for her radiation treatments. This RN called Dr. Johan Lui who asked to see her tomorrow. Armida aware of appt time with Dr. Johan Lui at 10:30.

## 2021-05-19 ENCOUNTER — HOSPITAL ENCOUNTER (OUTPATIENT)
Dept: RADIATION ONCOLOGY | Age: 56
Discharge: HOME | End: 2021-05-19
Payer: COMMERCIAL

## 2021-05-19 NOTE — PROGRESS NOTES
Pt arrives to [facility] office for appt with Dr. Buzz Mejía. At length discussion completed and new plan of care for steroids and anxiety in place for patient determined by Dr. Buzz Mejía and Lila Gutierrez RN with [facility]. This RN was present for part of this meeting. Patient will continue to follow up with Dr. Buzz Mejía and [facility].     Ainsley Falcon RN

## 2021-06-15 ENCOUNTER — CLINICAL DOCUMENTATION (OUTPATIENT)
Dept: OTHER | Age: 56
End: 2021-06-15

## 2021-08-25 ENCOUNTER — HOSPITAL ENCOUNTER (OUTPATIENT)
Dept: MRI IMAGING | Age: 56
Discharge: HOME | End: 2021-08-25
Payer: COMMERCIAL

## 2021-08-25 DIAGNOSIS — D32.0 MENINGIOMA, CEREBRAL (HCC): ICD-10-CM

## 2021-08-25 PROCEDURE — 6360000004 HC RX CONTRAST MEDICATION: Performed by: RADIOLOGY

## 2021-08-25 PROCEDURE — 70553 MRI BRAIN STEM W/O & W/DYE: CPT

## 2021-08-25 PROCEDURE — A9579 GAD-BASE MR CONTRAST NOS,1ML: HCPCS | Performed by: RADIOLOGY

## 2021-08-25 RX ADMIN — GADOTERIDOL 20 ML: 279.3 INJECTION, SOLUTION INTRAVENOUS at 10:52

## 2022-02-23 ENCOUNTER — HOSPITAL ENCOUNTER (OUTPATIENT)
Dept: MRI IMAGING | Age: 57
Discharge: HOME | End: 2022-02-23

## 2022-02-23 DIAGNOSIS — D32.0 MENINGIOMA, RECURRENT OF BRAIN (HCC): ICD-10-CM

## 2022-03-14 ENCOUNTER — HOSPITAL ENCOUNTER (OUTPATIENT)
Dept: MRI IMAGING | Age: 57
Discharge: HOME | End: 2022-03-14
Payer: COMMERCIAL

## 2022-03-14 DIAGNOSIS — D32.0 MENINGIOMA, RECURRENT OF BRAIN (HCC): ICD-10-CM

## 2022-03-14 PROCEDURE — 6360000004 HC RX CONTRAST MEDICATION: Performed by: RADIOLOGY

## 2022-03-14 PROCEDURE — A9579 GAD-BASE MR CONTRAST NOS,1ML: HCPCS | Performed by: RADIOLOGY

## 2022-03-14 PROCEDURE — 70553 MRI BRAIN STEM W/O & W/DYE: CPT

## 2022-03-14 RX ADMIN — GADOTERIDOL 20 ML: 279.3 INJECTION, SOLUTION INTRAVENOUS at 14:25

## 2022-10-21 ENCOUNTER — HOSPITAL ENCOUNTER (OUTPATIENT)
Dept: MRI IMAGING | Age: 57
Discharge: HOME | End: 2022-10-21
Payer: COMMERCIAL

## 2022-10-21 DIAGNOSIS — D32.0 BENIGN NEOPLASM OF CEREBRAL MENINGES (HCC): ICD-10-CM

## 2022-10-21 PROCEDURE — 6360000004 HC RX CONTRAST MEDICATION: Performed by: RADIOLOGY

## 2022-10-21 PROCEDURE — 70553 MRI BRAIN STEM W/O & W/DYE: CPT

## 2022-10-21 PROCEDURE — A9576 INJ PROHANCE MULTIPACK: HCPCS | Performed by: RADIOLOGY

## 2022-10-21 RX ADMIN — GADOTERIDOL 20 ML: 279.3 INJECTION, SOLUTION INTRAVENOUS at 14:25

## 2023-07-28 ENCOUNTER — HOSPITAL ENCOUNTER (OUTPATIENT)
Dept: MRI IMAGING | Age: 58
Discharge: HOME | End: 2023-07-28
Attending: RADIOLOGY
Payer: COMMERCIAL

## 2023-07-28 DIAGNOSIS — D32.0 BENIGN NEOPLASM OF CEREBRAL MENINGES (HCC): ICD-10-CM

## 2023-07-28 PROCEDURE — A9579 GAD-BASE MR CONTRAST NOS,1ML: HCPCS | Performed by: RADIOLOGY

## 2023-07-28 PROCEDURE — 6360000004 HC RX CONTRAST MEDICATION: Performed by: RADIOLOGY

## 2023-07-28 PROCEDURE — 70553 MRI BRAIN STEM W/O & W/DYE: CPT

## 2023-07-28 RX ADMIN — GADOTERIDOL 20 ML: 279.3 INJECTION, SOLUTION INTRAVENOUS at 15:26

## 2023-09-18 NOTE — PROGRESS NOTES
Patient arrived for [facility]. Patient states she still feels anxious, even with taking valium and ativan. Dr. Angle Flores and Dr. Donnie Gross both assessed the patient. Patient states she felt less anxious with the medication from [address] [place] [address] [id number]. This RN called her pharmacy and found that patient was given 0.5 mg xanax for previous MRI. Dr. Donnie Gross now aware and ordered 1mg xanax for gamma knife treatment. Pharmacy aware that patient is on her way. Patient aware of new medication order and instructions. Patient understands to stop taking ativan. no abdominal pain, no bloating, no constipation, no diarrhea, no nausea and no vomiting.

## (undated) DEVICE — STERILE POLYISOPRENE POWDER-FREE SURGICAL GLOVES: Brand: PROTEXIS

## (undated) DEVICE — TUBING PMP L8FT LNG W/ CONN FOR AR-6400 REDEUCE

## (undated) DEVICE — 3M™ TEGADERM™ TRANSPARENT FILM DRESSING FRAME STYLE, 1624W, 2-3/8 IN X 2-3/4 IN (6 CM X 7 CM), 100/CT 4CT/CASE: Brand: 3M™ TEGADERM™

## (undated) DEVICE — TUBE IRRIG L8IN LNG PT W/ CONN FOR PMP SYS REDEUCE

## (undated) DEVICE — SET GRAV VENT NVENT CK VLV 3 NDL FREE PRT 10 GTT

## (undated) DEVICE — SOLUTION IRRIG 5L LAC R BG

## (undated) DEVICE — PACK PROCEDURE SURG ARTHSCP CUST

## (undated) DEVICE — SOLUTION IV 1000ML LAC RINGERS PH 6.5 INJ USP VIAFLX PLAS

## (undated) DEVICE — SUTURE MCRYL SZ 4-0 L18IN ABSRB UD L19MM PS-2 3/8 CIR PRIM Y496G

## (undated) DEVICE — Z CONVERTED USE 2273232 BANDAGE COMPR W6INXL11YD E KNIT DBL SELF CLSR EZE-BAND

## (undated) DEVICE — 4.5 MM INCISOR PLUS STRAIGHT                                    BLADES, POWER/EP-1, VIOLET, PACKAGED                                    6 PER BOX, STERILE

## (undated) DEVICE — 3M™ STERI-STRIP™ REINFORCED ADHESIVE SKIN CLOSURES, R1547, 1/2 IN X 4 IN (12 MM X 100 MM), 6 STRIPS/ENVELOPE: Brand: 3M™ STERI-STRIP™

## (undated) DEVICE — GOWN,SIRUS,NON REINFRCD,LARGE,SET IN SL: Brand: MEDLINE

## (undated) DEVICE — PADDING CAST W4INXL4YD NONSTERILE COT RAYON MICROPLEATED

## (undated) DEVICE — CATHETER IV 20GA L1.25IN PNK FEP SFTY STR HUB RADPQ DISP

## (undated) DEVICE — ZIMMER® STERILE DISPOSABLE TOURNIQUET CUFF WITH PLC, DUAL PORT, SINGLE BLADDER, 30 IN. (76 CM)

## (undated) DEVICE — SET ADMIN PRIMING 7ML L30IN 7.35LB 20 GTT 2ND RLER CLMP

## (undated) DEVICE — PADDING CAST W6INXL4YD ST COT RAYON MICROPLEATED HIGHLY

## (undated) DEVICE — CONVERTED USE 304929 SPONGE GAUZE CURITY 4X4IN 16-PLY ST

## (undated) DEVICE — T-DRAPE,EXTREMITY,STERILE: Brand: MEDLINE